# Patient Record
Sex: MALE | Race: WHITE | NOT HISPANIC OR LATINO | Employment: FULL TIME | URBAN - METROPOLITAN AREA
[De-identification: names, ages, dates, MRNs, and addresses within clinical notes are randomized per-mention and may not be internally consistent; named-entity substitution may affect disease eponyms.]

---

## 2022-01-21 ENCOUNTER — OFFICE VISIT (OUTPATIENT)
Dept: INTERNAL MEDICINE | Age: 37
End: 2022-01-21

## 2022-01-21 VITALS
SYSTOLIC BLOOD PRESSURE: 116 MMHG | TEMPERATURE: 96.9 F | HEART RATE: 72 BPM | OXYGEN SATURATION: 100 % | BODY MASS INDEX: 34.3 KG/M2 | HEIGHT: 69 IN | DIASTOLIC BLOOD PRESSURE: 82 MMHG | WEIGHT: 231.6 LBS

## 2022-01-21 DIAGNOSIS — Z00.00 ENCOUNTER FOR ANNUAL PHYSICAL EXAM: Primary | ICD-10-CM

## 2022-01-21 DIAGNOSIS — Z11.59 NEED FOR HEPATITIS C SCREENING TEST: ICD-10-CM

## 2022-01-21 PROCEDURE — 90715 TDAP VACCINE 7 YRS/> IM: CPT | Performed by: NURSE PRACTITIONER

## 2022-01-21 PROCEDURE — 99385 PREV VISIT NEW AGE 18-39: CPT | Performed by: NURSE PRACTITIONER

## 2022-01-21 PROCEDURE — 90471 IMMUNIZATION ADMIN: CPT | Performed by: NURSE PRACTITIONER

## 2022-01-21 NOTE — PROGRESS NOTES
"[facility] [place] [facility]  HARRISON Small Queenie / 36 y.o. / male  01/21/2022                        VITALS     Visit Vitals  /82 (Cuff Size: Adult)   Pulse 72   Temp 96.9 °F (36.1 °C) (Temporal)   Ht 175.3 cm (69\")   Wt 105 kg (231 lb 9.6 oz)   SpO2 100%   BMI 34.20 kg/m²       BP Readings from Last 3 Encounters:   01/21/22 116/82     Wt Readings from Last 3 Encounters:   01/21/22 105 kg (231 lb 9.6 oz)      Body mass index is 34.2 kg/m².    MEDICATIONS     No current outpatient medications on file.     No current facility-administered medications for this visit.       _____________________________________________________________________________________    CHIEF COMPLAINT     Establish Care, Annual Exam, and Labs Only (fasting)      HISTORY OF PRESENT ILLNESS      Nestor presents for annual health maintenance visit.    · Last health maintenance visit: many years ago  · General health: excellent  · Lifestyle:  · Attempting to lose weight?: Yes   · Diet: eats moderately healthy  · Exercise: exercises 3 days/week  · Tobacco: Never used   · Alcohol: 1-2 occasions/week  · Work: Full-time  · Reproductive health:  · Sexually active?: Yes   · Concern for STD?: No   · Sexual problems?: No problems   · Sees Urologist?: No   · Depression Screening:      PHQ-2/PHQ-9 Depression Screening 1/21/2022   Little interest or pleasure in doing things 0   Feeling down, depressed, or hopeless 0   Total Score 0         PHQ-2: 0 (Not depressed)     PHQ-9: 0 (Negative screening for depression)    Patient Care Team:  Amando Smith APRN as PCP - General (Internal Medicine)  ______________________________________________________________________    ALLERGIES  No Known Allergies     PFSH:     The following portions of the patient's history were reviewed and updated as appropriate: Allergies / Current Medications / Past Medical History / Surgical History / Social History / Family History    PROBLEM LIST   There is no problem " list on file for this patient.      PAST MEDICAL HISTORY  History reviewed. No pertinent past medical history.    SURGICAL HISTORY  Past Surgical History:   Procedure Laterality Date   • KNEE ARTHROSCOPY W/ ACL RECONSTRUCTION Left 2001   • KNEE ARTHROSCOPY W/ DEBRIDEMENT Right        SOCIAL HISTORY  Social History     Socioeconomic History   • Marital status:    Tobacco Use   • Smoking status: Never Smoker   • Smokeless tobacco: Never Used   Vaping Use   • Vaping Use: Never used   Substance and Sexual Activity   • Alcohol use: Yes     Alcohol/week: 5.0 standard drinks     Types: 5 Cans of beer per week   • Sexual activity: Yes     Partners: Male     Birth control/protection: None       FAMILY HISTORY  Family History   Problem Relation Age of Onset   • Diabetes type II Mother    • Obesity Mother    • Stroke Father    • Arthritis Maternal Grandmother    • Breast cancer Maternal Grandmother    • Arthritis Paternal Grandmother        IMMUNIZATION HISTORY  Immunization History   Administered Date(s) Administered   • COVID-19 (MODERNA) 1st, 2nd, 3rd Dose Only 03/23/2021, 04/20/2021, 11/22/2021   • FluLaval/Fluarix/Fluzone >6 12/07/2021       ______________________________________________________________________    REVIEW OF SYSTEMS     Review of Systems   Constitutional: Negative.    HENT: Negative.    Eyes: Negative.    Respiratory: Negative.    Cardiovascular: Negative.    Gastrointestinal: Negative.    Endocrine: Negative.    Genitourinary: Negative.    Musculoskeletal: Negative.    Skin: Negative.    Allergic/Immunologic: Negative.    Neurological: Negative.    Hematological: Negative.    Psychiatric/Behavioral: Negative.      PHYSICAL EXAMINATION     Physical Exam  Constitutional:       Appearance: Normal appearance. He is obese.   HENT:      Head: Normocephalic and atraumatic.      Right Ear: Tympanic membrane normal.      Left Ear: Tympanic membrane normal.      Nose: Nose normal.      Mouth/Throat:       Mouth: Mucous membranes are moist.      Pharynx: Oropharynx is clear.   Eyes:      Conjunctiva/sclera: Conjunctivae normal.      Pupils: Pupils are equal, round, and reactive to light.   Neck:      Thyroid: No thyromegaly.      Vascular: No carotid bruit.   Cardiovascular:      Rate and Rhythm: Normal rate and regular rhythm.      Pulses: Normal pulses.      Heart sounds: Normal heart sounds.   Pulmonary:      Effort: Pulmonary effort is normal.      Breath sounds: Normal breath sounds.   Abdominal:      Palpations: Abdomen is soft.      Tenderness: There is no right CVA tenderness or left CVA tenderness.   Genitourinary:     Comments: For BRIANNA until 49, no  symptoms, no family history of prostate cancer  Musculoskeletal:         General: Normal range of motion.      Cervical back: Normal range of motion.      Right lower leg: No edema.      Left lower leg: No edema.   Lymphadenopathy:      Cervical: No cervical adenopathy.   Skin:     General: Skin is warm and dry.   Neurological:      Mental Status: He is alert and oriented to person, place, and time.      Cranial Nerves: No cranial nerve deficit.      Motor: No weakness.      Gait: Gait normal.      Deep Tendon Reflexes: Babinski sign absent on the right side. Babinski sign absent on the left side.      Reflex Scores:       Patellar reflexes are 2+ on the right side and 2+ on the left side.  Psychiatric:         Thought Content: Thought content normal.         Judgment: Judgment normal.        REVIEWED DATA      Labs:    No results found for: NA, K, CALCIUM, AST, ALT, BUN, CREATININE, EGFRIFNONA, EGFRIFAFRI    No results found for: GLU, HGBA1C, TSH, FREET4    No results found for: PSA, TESTOSTEROTT, TESTFRE    No results found for: LDL, HDL, TRIG, CHOLHDLRATIO    No components found for: ESAR462R    No results found for: WBC, HGB, MCV, PLT    No results found for: PROTEIN, GLUCOSEU, BLOODU, NITRITEU, LEUKOCYTESUR     No results found for:  HEPCVIRUSABY    Imaging:           Medical Tests:       ______________________________________________________________________    ASSESSMENT & PLAN     ANNUAL WELLNESS EXAM / PHYSICAL     Other medical problems addressed today:  Patient presents to establish care with new provider and obtain annual physical with labs. Very minimal health history except for right knee debridement and reconstruction.     Family history of diabetes, obesity and brain aneurysm in father. Arthritis, breast cancer heart disease and arthritis in grandparents.    Never smoker, occasional alcohol use no substance use.  with a stepdaughter of 9 years old. 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    Summary/Discussion:     · Recommend follow-up in 1 year for annual physical with fasting labs, earlier if needed depending on lab obtained today.  · Decrease/eliminate soda, caffeine, alcohol and overall caloric intake. Reduce carbohydrates and sweets in diet.  Continue to improve dietary habits with lean proteins, fresh vegetables, fruits, and nuts. Improve aerobic exercise: walking/biking/swimming daily as tolerated, recommend 30 minutes/day at least 5 days/week.    Next Appointment with me: Visit date not found    No follow-ups on file.      HEALTHCARE MAINTENANCE ISSUES       Cancer Screening:  · Colon: Initial/Next screening at age: 45  · Repeat colon cancer screening: N/A at this time  · Prostate: Start screening at 45 then annually  · Testicular: Recommended monthly self exam  · Skin: Monthly self skin examination, annual exam by health professional  · Lung: Does not meet criteria for lung cancer screening.   · Other:    Screening Labs & Tests:  · Lab results reviewed & discussed with with patient or orders placed today.  · EKG:  · CV Screening: Lipid panel  · DEXA (75+ or risk factors):   · HEP C (If born [birth year]256-[birth year]01 or risk factors): Ordered  · Other:     Immunization/Vaccinations (to be  given today unless deferred by patient)  · Influenza: Patient had the flu shot this season  · Hepatitis A: Recommended here or at pharmacy  · Tetanus/Pertussis: Administer today  · Pneumovax: Not needed at this time  · Shingles: Not needed at this time  · Other:     Lifestyle Counseling:  · Lifestyle Modifications: Improve dietary compliance and Increase intensity/regularity of aerobic exercise  · Safety Issues: Always wear seatbelt, Avoid texting while driving   · Use sunscreen, regular skin examination  · Recommended annual dental/vision examination.  · Emotional/Stress/Sleep: Reviewed and  given when appropriate      Health Maintenance   Topic Date Due   • TDAP/TD VACCINES (1 - Tdap) Never done   • HEPATITIS C SCREENING  Never done   • ANNUAL PHYSICAL  01/22/2023   • COVID-19 Vaccine  Completed   • INFLUENZA VACCINE  Completed   • Pneumococcal Vaccine 0-64  Aged Out         Examiner was wearing KN95 mask, face shield and exam gloves during the entire duration of the visit. Patient was masked the entire time.   Minimum social distance of 6 ft maintained entire visit except if physical contact was necessary as documented.     **Dragon Disclaimer:   Much of this encounter note is an electronic transcription/translation of spoken language to printed text. The electronic translation of spoken language may permit erroneous, or at times, nonsensical words or phrases to be inadvertently transcribed. Although I have reviewed the note for such errors, some may still exist.

## 2022-01-22 LAB
ALBUMIN SERPL-MCNC: 5 G/DL (ref 4–5)
ALBUMIN/GLOB SERPL: 2 {RATIO} (ref 1.2–2.2)
ALP SERPL-CCNC: 69 IU/L (ref 44–121)
ALT SERPL-CCNC: 39 IU/L (ref 0–44)
APPEARANCE UR: CLEAR
AST SERPL-CCNC: 25 IU/L (ref 0–40)
BASOPHILS # BLD AUTO: 0.1 X10E3/UL (ref 0–0.2)
BASOPHILS NFR BLD AUTO: 1 %
BILIRUB SERPL-MCNC: 0.9 MG/DL (ref 0–1.2)
BILIRUB UR QL STRIP: NEGATIVE
BUN SERPL-MCNC: 12 MG/DL (ref 6–20)
BUN/CREAT SERPL: 12 (ref 9–20)
CALCIUM SERPL-MCNC: 9.5 MG/DL (ref 8.7–10.2)
CHLORIDE SERPL-SCNC: 102 MMOL/L (ref 96–106)
CHOLEST SERPL-MCNC: 266 MG/DL (ref 100–199)
CHOLEST/HDLC SERPL: 6.7 RATIO (ref 0–5)
CO2 SERPL-SCNC: 23 MMOL/L (ref 20–29)
COLOR UR: YELLOW
CREAT SERPL-MCNC: 0.99 MG/DL (ref 0.76–1.27)
EOSINOPHIL # BLD AUTO: 0.3 X10E3/UL (ref 0–0.4)
EOSINOPHIL NFR BLD AUTO: 5 %
ERYTHROCYTE [DISTWIDTH] IN BLOOD BY AUTOMATED COUNT: 13.2 % (ref 11.6–15.4)
GLOBULIN SER CALC-MCNC: 2.5 G/DL (ref 1.5–4.5)
GLUCOSE SERPL-MCNC: 95 MG/DL (ref 65–99)
GLUCOSE UR QL STRIP: NEGATIVE
HBA1C MFR BLD: 5.3 % (ref 4.8–5.6)
HCT VFR BLD AUTO: 48.9 % (ref 37.5–51)
HCV AB S/CO SERPL IA: <0.1 S/CO RATIO (ref 0–0.9)
HDLC SERPL-MCNC: 40 MG/DL
HGB BLD-MCNC: 16.4 G/DL (ref 13–17.7)
HGB UR QL STRIP: NEGATIVE
IMM GRANULOCYTES # BLD AUTO: 0 X10E3/UL (ref 0–0.1)
IMM GRANULOCYTES NFR BLD AUTO: 0 %
KETONES UR QL STRIP: NEGATIVE
LDLC SERPL CALC-MCNC: 179 MG/DL (ref 0–99)
LEUKOCYTE ESTERASE UR QL STRIP: NEGATIVE
LYMPHOCYTES # BLD AUTO: 1.3 X10E3/UL (ref 0.7–3.1)
LYMPHOCYTES NFR BLD AUTO: 21 %
MCH RBC QN AUTO: 30.8 PG (ref 26.6–33)
MCHC RBC AUTO-ENTMCNC: 33.5 G/DL (ref 31.5–35.7)
MCV RBC AUTO: 92 FL (ref 79–97)
MICRO URNS: NORMAL
MONOCYTES # BLD AUTO: 0.5 X10E3/UL (ref 0.1–0.9)
MONOCYTES NFR BLD AUTO: 9 %
NEUTROPHILS # BLD AUTO: 3.9 X10E3/UL (ref 1.4–7)
NEUTROPHILS NFR BLD AUTO: 64 %
NITRITE UR QL STRIP: NEGATIVE
PH UR STRIP: 5 [PH] (ref 5–7.5)
PLATELET # BLD AUTO: 298 X10E3/UL (ref 150–450)
POTASSIUM SERPL-SCNC: 4.6 MMOL/L (ref 3.5–5.2)
PROT SERPL-MCNC: 7.5 G/DL (ref 6–8.5)
PROT UR QL STRIP: NEGATIVE
RBC # BLD AUTO: 5.33 X10E6/UL (ref 4.14–5.8)
SODIUM SERPL-SCNC: 140 MMOL/L (ref 134–144)
SP GR UR STRIP: 1.02 (ref 1–1.03)
T4 FREE SERPL-MCNC: 1.3 NG/DL (ref 0.82–1.77)
TRIGL SERPL-MCNC: 245 MG/DL (ref 0–149)
TSH SERPL-ACNC: 2.75 UIU/ML (ref 0.45–4.5)
UROBILINOGEN UR STRIP-MCNC: 0.2 MG/DL (ref 0.2–1)
VLDLC SERPL CALC-MCNC: 47 MG/DL (ref 5–40)
WBC # BLD AUTO: 6 X10E3/UL (ref 3.4–10.8)

## 2022-01-24 DIAGNOSIS — E78.5 HYPERLIPIDEMIA, UNSPECIFIED HYPERLIPIDEMIA TYPE: Primary | ICD-10-CM

## 2022-05-24 DIAGNOSIS — E78.5 HYPERLIPIDEMIA, UNSPECIFIED HYPERLIPIDEMIA TYPE: ICD-10-CM

## 2022-06-14 LAB
CHOLEST SERPL-MCNC: 217 MG/DL (ref 100–199)
CHOLEST/HDLC SERPL: 5.2 RATIO (ref 0–5)
HDLC SERPL-MCNC: 42 MG/DL
LDLC SERPL CALC-MCNC: 157 MG/DL (ref 0–99)
TRIGL SERPL-MCNC: 99 MG/DL (ref 0–149)
VLDLC SERPL CALC-MCNC: 18 MG/DL (ref 5–40)

## 2022-09-02 ENCOUNTER — HOSPITAL ENCOUNTER (OUTPATIENT)
Facility: HOSPITAL | Age: 37
Setting detail: OBSERVATION
Discharge: HOME | End: 2022-09-03
Attending: EMERGENCY MEDICINE | Admitting: EMERGENCY MEDICINE

## 2022-09-02 ENCOUNTER — APPOINTMENT (OUTPATIENT)
Dept: GENERAL RADIOLOGY | Facility: HOSPITAL | Age: 37
End: 2022-09-02

## 2022-09-02 ENCOUNTER — APPOINTMENT (OUTPATIENT)
Dept: CT IMAGING | Facility: HOSPITAL | Age: 37
End: 2022-09-02

## 2022-09-02 DIAGNOSIS — R55 SYNCOPE AND COLLAPSE: Primary | ICD-10-CM

## 2022-09-02 PROBLEM — R42 DIZZINESS: Status: ACTIVE | Noted: 2022-09-02

## 2022-09-02 PROBLEM — R42 DIZZINESS: Status: RESOLVED | Noted: 2022-09-02 | Resolved: 2022-09-02

## 2022-09-02 LAB
ALBUMIN SERPL-MCNC: 5.2 G/DL (ref 3.5–5.2)
ALBUMIN/GLOB SERPL: 2.4 G/DL
ALP SERPL-CCNC: 67 U/L (ref 39–117)
ALT SERPL W P-5'-P-CCNC: 26 U/L (ref 1–41)
ANION GAP SERPL CALCULATED.3IONS-SCNC: 15.7 MMOL/L (ref 5–15)
AST SERPL-CCNC: 22 U/L (ref 1–40)
BASOPHILS # BLD AUTO: 0.05 10*3/MM3 (ref 0–0.2)
BASOPHILS NFR BLD AUTO: 0.8 % (ref 0–1.5)
BILIRUB SERPL-MCNC: 1 MG/DL (ref 0–1.2)
BILIRUB UR QL STRIP: NEGATIVE
BUN SERPL-MCNC: 11 MG/DL (ref 6–20)
BUN/CREAT SERPL: 11.8 (ref 7–25)
CALCIUM SPEC-SCNC: 9.4 MG/DL (ref 8.6–10.5)
CHLORIDE SERPL-SCNC: 101 MMOL/L (ref 98–107)
CLARITY UR: CLEAR
CO2 SERPL-SCNC: 25.3 MMOL/L (ref 22–29)
COLOR UR: YELLOW
CREAT SERPL-MCNC: 0.93 MG/DL (ref 0.76–1.27)
DEPRECATED RDW RBC AUTO: 43.9 FL (ref 37–54)
EGFRCR SERPLBLD CKD-EPI 2021: 107.8 ML/MIN/1.73
EOSINOPHIL # BLD AUTO: 0.23 10*3/MM3 (ref 0–0.4)
EOSINOPHIL NFR BLD AUTO: 3.7 % (ref 0.3–6.2)
ERYTHROCYTE [DISTWIDTH] IN BLOOD BY AUTOMATED COUNT: 13.3 % (ref 12.3–15.4)
GLOBULIN UR ELPH-MCNC: 2.2 GM/DL
GLUCOSE SERPL-MCNC: 95 MG/DL (ref 65–99)
GLUCOSE UR STRIP-MCNC: NEGATIVE MG/DL
HCT VFR BLD AUTO: 45.3 % (ref 37.5–51)
HGB BLD-MCNC: 15.8 G/DL (ref 13–17.7)
HGB UR QL STRIP.AUTO: NEGATIVE
HOLD SPECIMEN: NORMAL
HOLD SPECIMEN: NORMAL
IMM GRANULOCYTES # BLD AUTO: 0.02 10*3/MM3 (ref 0–0.05)
IMM GRANULOCYTES NFR BLD AUTO: 0.3 % (ref 0–0.5)
INR PPP: 0.98 (ref 0.9–1.1)
KETONES UR QL STRIP: NEGATIVE
LEUKOCYTE ESTERASE UR QL STRIP.AUTO: NEGATIVE
LYMPHOCYTES # BLD AUTO: 1.51 10*3/MM3 (ref 0.7–3.1)
LYMPHOCYTES NFR BLD AUTO: 24.2 % (ref 19.6–45.3)
MAGNESIUM SERPL-MCNC: 1.9 MG/DL (ref 1.6–2.6)
MAGNESIUM SERPL-MCNC: 2.1 MG/DL (ref 1.6–2.6)
MCH RBC QN AUTO: 31.3 PG (ref 26.6–33)
MCHC RBC AUTO-ENTMCNC: 34.9 G/DL (ref 31.5–35.7)
MCV RBC AUTO: 89.7 FL (ref 79–97)
MONOCYTES # BLD AUTO: 0.5 10*3/MM3 (ref 0.1–0.9)
MONOCYTES NFR BLD AUTO: 8 % (ref 5–12)
NEUTROPHILS NFR BLD AUTO: 3.93 10*3/MM3 (ref 1.7–7)
NEUTROPHILS NFR BLD AUTO: 63 % (ref 42.7–76)
NITRITE UR QL STRIP: NEGATIVE
NRBC BLD AUTO-RTO: 0 /100 WBC (ref 0–0.2)
PH UR STRIP.AUTO: 6 [PH] (ref 5–8)
PLATELET # BLD AUTO: 236 10*3/MM3 (ref 140–450)
PMV BLD AUTO: 9.6 FL (ref 6–12)
POTASSIUM SERPL-SCNC: 3.8 MMOL/L (ref 3.5–5.2)
PROT SERPL-MCNC: 7.4 G/DL (ref 6–8.5)
PROT UR QL STRIP: NEGATIVE
PROTHROMBIN TIME: 12.9 SECONDS (ref 11.7–14.2)
QT INTERVAL: 385 MS
RBC # BLD AUTO: 5.05 10*6/MM3 (ref 4.14–5.8)
SODIUM SERPL-SCNC: 142 MMOL/L (ref 136–145)
SP GR UR STRIP: 1.01 (ref 1–1.03)
TROPONIN T SERPL-MCNC: <0.01 NG/ML (ref 0–0.03)
TROPONIN T SERPL-MCNC: <0.01 NG/ML (ref 0–0.03)
UROBILINOGEN UR QL STRIP: NORMAL
WBC NRBC COR # BLD: 6.24 10*3/MM3 (ref 3.4–10.8)
WHOLE BLOOD HOLD COAG: NORMAL
WHOLE BLOOD HOLD SPECIMEN: NORMAL

## 2022-09-02 PROCEDURE — 70450 CT HEAD/BRAIN W/O DYE: CPT

## 2022-09-02 PROCEDURE — 71046 X-RAY EXAM CHEST 2 VIEWS: CPT

## 2022-09-02 PROCEDURE — 83735 ASSAY OF MAGNESIUM: CPT

## 2022-09-02 PROCEDURE — 84484 ASSAY OF TROPONIN QUANT: CPT | Performed by: EMERGENCY MEDICINE

## 2022-09-02 PROCEDURE — 80053 COMPREHEN METABOLIC PANEL: CPT

## 2022-09-02 PROCEDURE — G0378 HOSPITAL OBSERVATION PER HR: HCPCS

## 2022-09-02 PROCEDURE — 99284 EMERGENCY DEPT VISIT MOD MDM: CPT

## 2022-09-02 PROCEDURE — 36415 COLL VENOUS BLD VENIPUNCTURE: CPT

## 2022-09-02 PROCEDURE — 81003 URINALYSIS AUTO W/O SCOPE: CPT

## 2022-09-02 PROCEDURE — 93005 ELECTROCARDIOGRAM TRACING: CPT

## 2022-09-02 PROCEDURE — 83735 ASSAY OF MAGNESIUM: CPT | Performed by: EMERGENCY MEDICINE

## 2022-09-02 PROCEDURE — 93010 ELECTROCARDIOGRAM REPORT: CPT | Performed by: INTERNAL MEDICINE

## 2022-09-02 PROCEDURE — 84484 ASSAY OF TROPONIN QUANT: CPT

## 2022-09-02 PROCEDURE — 85610 PROTHROMBIN TIME: CPT | Performed by: EMERGENCY MEDICINE

## 2022-09-02 PROCEDURE — 85025 COMPLETE CBC W/AUTO DIFF WBC: CPT

## 2022-09-02 RX ORDER — NITROGLYCERIN 0.4 MG/1
0.4 TABLET SUBLINGUAL
Status: DISCONTINUED | OUTPATIENT
Start: 2022-09-02 | End: 2022-09-03 | Stop reason: HOSPADM

## 2022-09-02 RX ORDER — SODIUM CHLORIDE 0.9 % (FLUSH) 0.9 %
10 SYRINGE (ML) INJECTION AS NEEDED
Status: DISCONTINUED | OUTPATIENT
Start: 2022-09-02 | End: 2022-09-03 | Stop reason: HOSPADM

## 2022-09-02 RX ORDER — SODIUM CHLORIDE 0.9 % (FLUSH) 0.9 %
10 SYRINGE (ML) INJECTION EVERY 12 HOURS SCHEDULED
Status: DISCONTINUED | OUTPATIENT
Start: 2022-09-02 | End: 2022-09-03 | Stop reason: HOSPADM

## 2022-09-02 NOTE — H&P
[facility]   HISTORY AND PHYSICAL    Patient Name: Nestor Jerome  : 1985  MRN: [id number]  Primary Care Physician:  Amando Smith APRN  Date of admission: 2022    Subjective   Subjective     Chief Complaint:   Chief Complaint   Patient presents with   • Dizziness   • Syncope         HPI:    Nestor Jerome is a 37 y.o. male no significant past medical history who presents to [facility] ER with dizziness and a syncopal episode.  Patient states last night he had difficulty sleeping and stayed up for most of the night.  He states today around noon while he was working he began to feel lightheaded and nauseous prompting him to lie on the ground.  He states he is pretty certain he passed out and did not fall asleep.  He states he looked through his phone and his notifications noting he he had a time loss of about 5 minutes.  He states when he awoken he was alert and asymptomatic at that time.  He denies any recent fevers and chills.  He does endorse a mild diffuse headache at this time.  Denies any numbness and tingling or focal weakness.  He denies chest pain and shortness of breath.  Denies palpitations.  Denies abdominal pain, vomiting, and diarrhea.  Patient reports he takes no home medications and denies any history of heart or lung problems.    ER evaluation significant for CT head without revealing no acute process with note of a partially empty sella and a large mucous retention cyst in the left paranasal sinuses.  Chest x-ray showed no acute findings.  Laboratory evaluation fairly unremarkable.  Patient noted with 2 negative troponins and EKG noted nonischemic.  Orthostatics were negative in the ER. Patient currently asymptomatic.    Review of Systems   All systems were reviewed and negative except for: what is mentioned above in the HPI.     Personal History     No past medical history on file.    Past Surgical History:   Procedure Laterality Date   • KNEE ARTHROSCOPY W/ ACL  RECONSTRUCTION Left 2001   • KNEE ARTHROSCOPY W/ DEBRIDEMENT Right        Family History: family history includes Arthritis in his maternal grandmother and paternal grandmother; Breast cancer in his maternal grandmother; Diabetes type II in his mother; Heart disease in his maternal grandfather; Obesity in his mother; Stroke in his father. Otherwise pertinent FHx was reviewed and not pertinent to current issue.    Social History:  reports that he has never smoked. He has never used smokeless tobacco. He reports current alcohol use of about 5.0 standard drinks of alcohol per week. He reports that he does not use drugs.    Home Medications:   None per patient    Allergies:  No Known Allergies    Objective   Objective     Vitals:   Temp:  [97.8 °F (36.6 °C)-98 °F (36.7 °C)] 98 °F (36.7 °C)  Heart Rate:  [46-98] 76  Resp:  [18] 18  BP: ()/(49-98) 147/98  Physical Exam    Constitutional: Awake, alert   Eyes: PERRLA, sclerae anicteric, no conjunctival injection   HENT: NCAT, mucous membranes moist   Neck: Supple, no thyromegaly, no lymphadenopathy, trachea midline   Respiratory: Clear to auscultation bilaterally, nonlabored respirations    Cardiovascular: RRR, no murmurs, rubs, or gallops, palpable pedal pulses bilaterally   Gastrointestinal: Positive bowel sounds, soft, nontender, nondistended   Musculoskeletal: No bilateral ankle edema, no clubbing or cyanosis to extremities   Psychiatric: Appropriate affect, cooperative   Neurologic: Oriented x 3, strength symmetric in all extremities, Cranial Nerves grossly intact to confrontation, speech clear, no facial droop noted   Skin: No rashes     Result Review    Result Review:  I have personally reviewed the results from the time of this admission to 9/2/2022 19:54 EDT and agree with these findings:  [x]  Laboratory list / accordion  []  Microbiology  [x]  Radiology  [x]  EKG/Telemetry   []  Cardiology/Vascular   []  Pathology  []  Old records  []  Other:  Most notable  findings include:     XR Chest 2 View    Result Date: 9/2/2022  CHEST TWO VIEWS  HISTORY: Weakness, dizziness.  COMPARISON: None.  PA and lateral views of the chest demonstrate the heart to be within normal limits in size. There is no evidence of infiltrate, effusion or of congestive failure. Mild dextroscoliosis of the thoracic spine is appreciated.      CT Head Without Contrast    Result Date: 9/2/2022  CT HEAD WITHOUT CONTRAST  HISTORY: Syncope, dizziness.  COMPARISON: None.  FINDINGS: There is minimal asymmetry related to the lateral ventricles with the right lateral ventricle being slightly larger than that of the left. This is within normal limits in terms of variance. There is no evidence of intracranial hemorrhage, hydrocephalus or of abnormal extra-axial fluid. No focal area of decreased attenuation to suggest acute infarction is identified. There is partial visualization of the left maxillary sinus which is completely opacified. There is likely a large mucus retention cyst involving the majority of the left maxillary sinus. There is a partially empty sella of doubtful clinical significance.      1. No acute process identified. Further evaluation could be performed with MRI examination of the brain as indicated. 2. There is a partially empty sella which is of doubtful clinical significance. 3. There is partial visualization of the paranasal sinuses but a large mucus retention cyst is present on the left.    Radiation dose reduction techniques were utilized, including automated exposure control and exposure modulation based on body size.           Assessment & Plan   Assessment / Plan     Brief Patient Summary:  Nestor Jerome is a 37 y.o. male who is being admitted to the observation unit for further evaluation of a syncopal episode and dizziness. Plan for cardiology consultation.     Active Hospital Problems:  Active Hospital Problems    Diagnosis    • Syncope      Plan:     Syncope  -Patient is currently  asymptomatic at this time  -Orthostatics negative in the ER  -CT head negative for acute process, was noted of a partially empty sella with doubtful clinical significance and a large mucous retention cyst in the left paranasal sinus, patient notified of the findings and advised for outpatient follow-up with PCP  -Chest x-ray negative for acute findings  -EKG normal sinus rhythm, no evidence of ischemia  -Consult to cardiology  -Echocardiogram ordered      DVT prophylaxis:  Mechanical DVT prophylaxis orders are present.    CODE STATUS:    Level Of Support Discussed With: Patient  Code Status (Patient has no pulse and is not breathing): CPR (Attempt to Resuscitate)  Medical Interventions (Patient has pulse or is breathing): Full Support    Admission Status:  I believe this patient meets observation status.    I wore an face mask, eye protection, and gloves during this patient encounter. Patient also wearing a surgical mask. Hand hygeine performed before and after seeing the patient.    Electronically signed by Shayna Dixon PA-C, 09/02/22, 7:54 PM EDT.

## 2022-09-02 NOTE — CASE MANAGEMENT/SOCIAL WORK
Discharge Planning Assessment  [facility]     Patient Name: Nestor Jerome  MRN: [id number]  Today's Date: 9/2/2022    Admit Date: 9/2/2022     Discharge Needs Assessment     Row Name 09/02/22 1650       Living Environment    People in Home spouse    Name(s) of People in Home Radha Wade [phone]    Current Living Arrangements home    Primary Care Provided by self    Provides Primary Care For no one    Family Caregiver if Needed spouse    Family Caregiver Names Radha Wade    Quality of Family Relationships helpful;involved    Able to Return to Prior Arrangements yes       Resource/Environmental Concerns    Resource/Environmental Concerns none    Transportation Concerns none       Transition Planning    Patient/Family Anticipates Transition to home    Patient/Family Anticipated Services at Transition none    Transportation Anticipated car, drives self;family or friend will provide       Discharge Needs Assessment    Readmission Within the Last 30 Days no previous admission in last 30 days    Equipment Currently Used at Home none    Concerns to be Addressed no discharge needs identified;denies needs/concerns at this time    Anticipated Changes Related to Illness none    Equipment Needed After Discharge none    Provided Post Acute Provider List? N/A    Provided Post Acute Provider Quality & Resource List? N/A               Discharge Plan     Row Name 09/02/22 1652       Plan    Plan Pt intends to return home upon discharge    Patient/Family in Agreement with Plan yes    Provided Post Acute Provider List? N/A    Provided Post Acute Provider Quality & Resource List? N/A    Plan Comments Face sheet verified, role of CCP explained. Pt is independent in ADL's and denies the need for any assistive devices. Pt denies any difficulty paying for medications. Advised pt to contact case management if any further needs arise              Continued Care and Services - Admitted Since 9/2/2022    Coordination has not  been started for this encounter.          Demographic Summary    No documentation.                Functional Status    No documentation.                Psychosocial    No documentation.                Abuse/Neglect    No documentation.                Legal    No documentation.                Substance Abuse    No documentation.                Patient Forms    No documentation.                   Alexa Miles RN

## 2022-09-02 NOTE — ED NOTES
"Nursing report ED to floor  Nestor Jerome  37 y.o.  male    HPI :   Chief Complaint   Patient presents with   • Dizziness   • Syncope       Admitting doctor:   Dwight Paris MD    Admitting diagnosis:   The encounter diagnosis was Syncope and collapse.    Code status:   Current Code Status     Date Active Code Status Order ID Comments User Context       9/2/2022 1519 CPR (Attempt to Resuscitate) [id number]  Yu Vazquez APRN ED     Advance Care Planning Activity      Questions for Current Code Status     Question Answer    Code Status (Patient has no pulse and is not breathing) CPR (Attempt to Resuscitate)    Medical Interventions (Patient has pulse or is breathing) Full Support    Level Of Support Discussed With Patient          Allergies:   Patient has no known allergies.    Intake and Output  No intake or output data in the 24 hours ending 09/02/22 1716    Weight:       09/02/22  1537   Weight: 100 kg (221 lb)       Most recent vitals:   Vitals:    09/02/22 1533 09/02/22 1537 09/02/22 1540 09/02/22 1600   BP: 95/49  150/94 143/93   Pulse: (!) 46  64 53   Resp:       Temp:       TempSrc:       SpO2: 97%  100% 100%   Weight:  100 kg (221 lb)     Height:  175.3 cm (69\")         Active LDAs/IV Access:   Lines, Drains & Airways     Active LDAs     Name Placement date Placement time Site Days    Peripheral IV 09/02/22 1534 Right Antecubital 09/02/22  1534  Antecubital  less than 1                Labs (abnormal labs have a star):   Labs Reviewed   COMPREHENSIVE METABOLIC PANEL - Abnormal; Notable for the following components:       Result Value    Anion Gap 15.7 (*)     All other components within normal limits    Narrative:     GFR Normal >60  Chronic Kidney Disease <60  Kidney Failure <15     TROPONIN (IN-HOUSE) - Normal    Narrative:     Troponin T Reference Range:  <= 0.03 ng/mL-   Negative for AMI  >0.03 ng/mL-     Abnormal for myocardial necrosis.  Clinicians would have to utilize clinical acumen, EKG, Troponin " and serial changes to determine if it is an Acute Myocardial Infarction or myocardial injury due to an underlying chronic condition.       Results may be falsely decreased if patient taking Biotin.     MAGNESIUM - Normal   URINALYSIS W/ CULTURE IF INDICATED - Normal    Narrative:     In absence of clinical symptoms, the presence of pyuria, bacteria, and/or nitrites on the urinalysis result does not correlate with infection.  Urine microscopic not indicated.   CBC WITH AUTO DIFFERENTIAL - Normal   MAGNESIUM - Normal   PROTIME-INR - Normal   TROPONIN (IN-HOUSE) - Normal    Narrative:     Troponin T Reference Range:  <= 0.03 ng/mL-   Negative for AMI  >0.03 ng/mL-     Abnormal for myocardial necrosis.  Clinicians would have to utilize clinical acumen, EKG, Troponin and serial changes to determine if it is an Acute Myocardial Infarction or myocardial injury due to an underlying chronic condition.       Results may be falsely decreased if patient taking Biotin.     RAINBOW DRAW    Narrative:     The following orders were created for panel order [place] Draw.  Procedure                               Abnormality         Status                     ---------                               -----------         ------                     Green Top (Gel)[[id number]]                                  Final result               Lavender Top[[id number]]                                     Final result               Gold Top - SST[[id number]]                                   Final result               Light Blue Top[[id number]]                                   Final result                 Please view results for these tests on the individual orders.   CBC AND DIFFERENTIAL    Narrative:     The following orders were created for panel order CBC & Differential.  Procedure                               Abnormality         Status                     ---------                               -----------         ------                     CBC  Auto Differential[[id number]]        Normal              Final result                 Please view results for these tests on the individual orders.   GREEN TOP   LAVENDER TOP   GOLD TOP - SST   LIGHT BLUE TOP       EKG:   ECG 12 Lead   Preliminary Result   HEART RATE= 68  bpm   RR Interval= 882  ms   [place] Interval= 150  ms   P Horizontal Axis= 18  deg   P Front Axis= 40  deg   QRSD Interval= 105  ms   QT Interval= 385  ms   QRS Axis= 59  deg   T Wave Axis= 19  deg   - NORMAL ECG -   Sinus rhythm   Electronically Signed By:    Date and Time of Study: 2022-09-02 13:33:33          Meds given in ED:   Medications   sodium chloride 0.9 % flush 10 mL (has no administration in time range)   sodium chloride 0.9 % flush 10 mL (has no administration in time range)   nitroglycerin (NITROSTAT) SL tablet 0.4 mg (has no administration in time range)   sodium chloride 0.9 % flush 10 mL (has no administration in time range)   sodium chloride 0.9 % flush 10 mL (has no administration in time range)       Imaging results:  CT Head Without Contrast    Result Date: 9/2/2022  1. No acute process identified. Further evaluation could be performed with MRI examination of the brain as indicated. 2. There is a partially empty sella which is of doubtful clinical significance. 3. There is partial visualization of the paranasal sinuses but a large mucus retention cyst is present on the left.    Radiation dose reduction techniques were utilized, including automated exposure control and exposure modulation based on body size.         Ambulatory status:   - stand by assist    Social issues:   Social History     Socioeconomic History   • Marital status:    Tobacco Use   • Smoking status: Never Smoker   • Smokeless tobacco: Never Used   Vaping Use   • Vaping Use: Never used   Substance and Sexual Activity   • Alcohol use: Yes     Alcohol/week: 5.0 standard drinks     Types: 5 Cans of beer per week   • Drug use: Never   • Sexual activity: Yes      Partners: Female     Birth control/protection: None       NIH Stroke Scale:        Nursing report ED to floor:

## 2022-09-02 NOTE — PLAN OF CARE
Goal Outcome Evaluation:   Pt admitted for dizziness and passing out for 5 minutes while working from home. Cards consulted. Npo at midnight. VSS.

## 2022-09-02 NOTE — ED TRIAGE NOTES
Pt presents to ED with complaints of possible syncopal episode while in his office today. Pt states he got dizzy around 1245-pt laid down on the floor and felt as though he lost consciousness. Pt denies CP-somewhat dizzy at triage and increased fatigue. Pt denies any complaints before this episode.

## 2022-09-02 NOTE — ED PROVIDER NOTES
EMERGENCY DEPARTMENT ENCOUNTER    Room Number:  109/1  Date of encounter:  9/7/2022  PCP: Amando Smith APRN  Historian: Patient and spouse      HPI:  Chief Complaint: Passing out episodes  A complete HPI/ROS/PMH/PSH/SH/FH are unobtainable due to: Not applicable  Context: Nestor Jerome is a 37 y.o. male who presents to the ED c/o patient today 2 hours prior to arrival here was at work.  He works from home.  He felt a little nausea.  He laid down and he believes he passed out for 5 minutes.  He was home alone.  He did not have any urinary or fecal incontinence or retention.  He did not bite his lip or tongue.  He had a similar episode that he believes last night a little before midnight.  He denies any chest pain or shortness of breath.  He is pretty certain that he did not fall asleep and believes that he passed out.  These episodes were unwitnessed.  His spouse said that he appeared a little confused and forgetful when she arrived home but that is 100% resolved.  He denies any headache, focal weakness, fever, chills, or any other complaints.  He has no history of heart problems, lung problems.  Again denies any chest pain, swelling to his arms or legs, or any other complaint.  Currently is asymptomatic        Previous Episodes: No  Current Symptoms: See above    MEDICAL HISTORY REVIEWED        PAST MEDICAL HISTORY  Active Ambulatory Problems     Diagnosis Date Noted   • No Active Ambulatory Problems     Resolved Ambulatory Problems     Diagnosis Date Noted   • No Resolved Ambulatory Problems     No Additional Past Medical History         PAST SURGICAL HISTORY  Past Surgical History:   Procedure Laterality Date   • KNEE ARTHROSCOPY W/ ACL RECONSTRUCTION Left 2001   • KNEE ARTHROSCOPY W/ DEBRIDEMENT Right          FAMILY HISTORY  Family History   Problem Relation Age of Onset   • Diabetes type II Mother    • Obesity Mother    • Stroke Father    • Arthritis Maternal Grandmother    • Breast cancer Maternal  Grandmother    • Arthritis Paternal Grandmother    • Heart disease Maternal Grandfather          SOCIAL HISTORY  Social History     Socioeconomic History   • Marital status:    Tobacco Use   • Smoking status: Never Smoker   • Smokeless tobacco: Never Used   Vaping Use   • Vaping Use: Never used   Substance and Sexual Activity   • Alcohol use: Yes     Alcohol/week: 5.0 standard drinks     Types: 5 Cans of beer per week   • Drug use: Never   • Sexual activity: Yes     Partners: Female     Birth control/protection: None         ALLERGIES  Patient has no known allergies.        REVIEW OF SYSTEMS  Review of Systems     All systems reviewed and negative except for those discussed in HPI.       PHYSICAL EXAM    I have reviewed the triage vital signs and nursing notes.    ED Triage Vitals   Temp Heart Rate Resp BP SpO2   09/02/22 1329 09/02/22 1329 09/02/22 1329 09/02/22 1449 09/02/22 1329   97.8 °F (36.6 °C) 75 18 140/96 99 %      Temp src Heart Rate Source Patient Position BP Location FiO2 (%)   09/02/22 1329 09/02/22 1329 -- -- --   Oral Monitor          GENERAL: Male no acute distress.Vital signs on my initial evaluation unremarkable  HENT: nares patent  Head/neck/ face are symmetric without gross deformity, signs of trauma, or swelling  EYES: no scleral icterus, no conjunctival pallor.  NECK: Supple, no meningismus  CV: regular rhythm, regular rate with intact distal pulses.  No murmur rub  RESPIRATORY: normal effort and no respiratory distress.  Clear to auscultation bilaterally  ABDOMEN: soft and nontender.  MUSCULOSKELETAL: no deformity.  Intact distal pulses to upper and lower extremities that are equal strong and symmetric.  No edema or swelling  NEURO: alert and appropriate, moves all extremities, follows commands.  No focal motor or sensory changes  SKIN: warm, dry    Vital signs and nursing notes reviewed.        LAB RESULTS  No results found for this or any previous visit (from the past 24  hour(s)).    Ordered the above labs and independently reviewed the results.        RADIOLOGY  No Radiology Exams Resulted Within Past 24 Hours    I ordered the above noted radiological studies. Reviewed by me and discussed with radiologist.  See dictation for official radiology interpretation.      PROCEDURES    Procedures      MEDICATIONS GIVEN IN ER    Medications   perflutren (DEFINITY) 8.476 mg in Sodium Chloride (PF) 0.9 % 10 mL injection (2 mL Intravenous Given 9/3/22 0831)         PROGRESS, DATA ANALYSIS, CONSULTS, AND MEDICAL DECISION MAKING    Informed patient of the test that we will order.  All questions answered at this time.  He is asymptomatic.    We are currently under a pandemic from the COVID19 infection.  The patient presented to the emergency department by ambulance or personal vehicle. I followed the current protocols required by [facility] in my evaluation and treatment of the patient. The patient was wearing a face mask during my evaluation and throughout my encounter. During my whole encounter with this patient I used appropriate personal protective equipment.  This equipment consisted of eye protection, facemask, gown, and gloves.  I applied this equipment before entering the room.      All labs have been independently reviewed by me.  All radiology studies have been reviewed by me and discussed with radiologist dictating the report.   EKG's independently viewed and interpreted by me.  Discussion below represents my analysis of pertinent findings related to patient's condition, differential diagnosis, treatment plan and final disposition.      ED Course as of 09/07/22 2307   Fri Sep 02, 2022   1445 I reviewed the chest x-ray report from the radiologist I also looked at the chest x-ray.  No acute abnormality appreciated. [MM]   1445 EKG reading  EKG was done at 1333  Its a rate of 68 it is sinus rhythm there is very mild intraventricular conduction delay  there is normal axis I do not appreciate any acute injury pattern  Normal QT  I do not see any signs of WPW, Brugada syndrome, QT abnormality or hypertrophic obstructive cardiomyopathy changes on this EKG.  No old EKG to compare with [MM]   6677 I discussed the case with Yu who is the midlevel provider in the observation unit.  She agrees to admit the patient to the hospital. [MM]   1847 I talked with the patient as well as spouse about the results of the test and EKG.  The patient and the spouse would prefer to be thorough in the evaluation.  They actually would like to be admitted to the observation unit for further monitoring. [MM]      ED Course User Index  [MM] Neto Fam MD       AS OF 23:07 EDT VITALS:    BP - 145/88  HR - 57  TEMP - 98.3 °F (36.8 °C) (Oral)  02 SATS - 98%        DIAGNOSIS  Final diagnoses:   Syncope and collapse         DISPOSITION  Patient been to a monitored bed in the observation unit.          DICTATED UTILIZING DRAGON DICTATION     Neto Fam MD  09/07/22 4326

## 2022-09-03 ENCOUNTER — APPOINTMENT (OUTPATIENT)
Dept: CARDIOLOGY | Facility: HOSPITAL | Age: 37
End: 2022-09-03

## 2022-09-03 ENCOUNTER — READMISSION MANAGEMENT (OUTPATIENT)
Dept: CALL CENTER | Facility: HOSPITAL | Age: 37
End: 2022-09-03

## 2022-09-03 VITALS
DIASTOLIC BLOOD PRESSURE: 88 MMHG | RESPIRATION RATE: 16 BRPM | BODY MASS INDEX: 32.65 KG/M2 | TEMPERATURE: 98.3 F | HEART RATE: 57 BPM | OXYGEN SATURATION: 98 % | HEIGHT: 69 IN | SYSTOLIC BLOOD PRESSURE: 145 MMHG | WEIGHT: 220.46 LBS

## 2022-09-03 LAB
AORTIC DIMENSIONLESS INDEX: 0.8 (DI)
BH CV ECHO MEAS - AO MAX PG: 8.1 MMHG
BH CV ECHO MEAS - AO MEAN PG: 4.3 MMHG
BH CV ECHO MEAS - AO V2 MAX: 142.5 CM/SEC
BH CV ECHO MEAS - AO V2 VTI: 30 CM
BH CV ECHO MEAS - AVA(I,D): 3.9 CM2
BH CV ECHO MEAS - EDV(CUBED): 159 ML
BH CV ECHO MEAS - EDV(MOD-SP2): 128 ML
BH CV ECHO MEAS - EDV(MOD-SP4): 126 ML
BH CV ECHO MEAS - EF(MOD-BP): 66.3 %
BH CV ECHO MEAS - EF(MOD-SP2): 70.3 %
BH CV ECHO MEAS - EF(MOD-SP4): 61.9 %
BH CV ECHO MEAS - ESV(CUBED): 43.2 ML
BH CV ECHO MEAS - ESV(MOD-SP2): 38 ML
BH CV ECHO MEAS - ESV(MOD-SP4): 48 ML
BH CV ECHO MEAS - FS: 35.2 %
BH CV ECHO MEAS - IVS/LVPW: 1.15 CM
BH CV ECHO MEAS - IVSD: 1.03 CM
BH CV ECHO MEAS - LAT PEAK E' VEL: 19.1 CM/SEC
BH CV ECHO MEAS - LV DIASTOLIC VOL/BSA (35-75): 58.6 CM2
BH CV ECHO MEAS - LV MASS(C)D: 198.2 GRAMS
BH CV ECHO MEAS - LV MAX PG: 8 MMHG
BH CV ECHO MEAS - LV MEAN PG: 3.5 MMHG
BH CV ECHO MEAS - LV SYSTOLIC VOL/BSA (12-30): 22.3 CM2
BH CV ECHO MEAS - LV V1 MAX: 141.4 CM/SEC
BH CV ECHO MEAS - LV V1 VTI: 25 CM
BH CV ECHO MEAS - LVIDD: 5.4 CM
BH CV ECHO MEAS - LVIDS: 3.5 CM
BH CV ECHO MEAS - LVOT AREA: 4.7 CM2
BH CV ECHO MEAS - LVOT DIAM: 2.44 CM
BH CV ECHO MEAS - LVPWD: 0.9 CM
BH CV ECHO MEAS - MED PEAK E' VEL: 10.9 CM/SEC
BH CV ECHO MEAS - MV A DUR: 108 SEC
BH CV ECHO MEAS - MV A MAX VEL: 65.6 CM/SEC
BH CV ECHO MEAS - MV DEC SLOPE: 470.7 CM/SEC2
BH CV ECHO MEAS - MV DEC TIME: 174 MSEC
BH CV ECHO MEAS - MV E MAX VEL: 88.6 CM/SEC
BH CV ECHO MEAS - MV E/A: 1.35
BH CV ECHO MEAS - MV MAX PG: 5 MMHG
BH CV ECHO MEAS - MV MEAN PG: 1.26 MMHG
BH CV ECHO MEAS - MV P1/2T: 72.2 MSEC
BH CV ECHO MEAS - MV V2 VTI: 32.7 CM
BH CV ECHO MEAS - MVA(P1/2T): 3 CM2
BH CV ECHO MEAS - MVA(VTI): 3.6 CM2
BH CV ECHO MEAS - PA ACC TIME: 0.13 SEC
BH CV ECHO MEAS - PA PR(ACCEL): 20.8 MMHG
BH CV ECHO MEAS - PA V2 MAX: 109.6 CM/SEC
BH CV ECHO MEAS - QP/QS: 0.9
BH CV ECHO MEAS - RV MAX PG: 2.43 MMHG
BH CV ECHO MEAS - RV V1 MAX: 78 CM/SEC
BH CV ECHO MEAS - RV V1 VTI: 18.6 CM
BH CV ECHO MEAS - RVOT DIAM: 2.7 CM
BH CV ECHO MEAS - SI(MOD-SP2): 41.8 ML/M2
BH CV ECHO MEAS - SI(MOD-SP4): 36.3 ML/M2
BH CV ECHO MEAS - SV(LVOT): 116.9 ML
BH CV ECHO MEAS - SV(MOD-SP2): 90 ML
BH CV ECHO MEAS - SV(MOD-SP4): 78 ML
BH CV ECHO MEAS - SV(RVOT): 105.7 ML
BH CV ECHO MEASUREMENTS AVERAGE E/E' RATIO: 5.91
BH CV XLRA - RV BASE: 3.9 CM
BH CV XLRA - RV LENGTH: 7.8 CM
BH CV XLRA - RV MID: 3 CM
BH CV XLRA - TDI S': 17.1 CM/SEC
CHOLEST SERPL-MCNC: 226 MG/DL (ref 0–200)
HDLC SERPL-MCNC: 42 MG/DL (ref 40–60)
LDLC SERPL CALC-MCNC: 151 MG/DL (ref 0–100)
LDLC/HDLC SERPL: 3.51 {RATIO}
LEFT ATRIUM VOLUME INDEX: 24 ML/M2
MAXIMAL PREDICTED HEART RATE: 182 BPM
QT INTERVAL: 426 MS
SINUS: 3.2 CM
STRESS TARGET HR: 155 BPM
T4 FREE SERPL-MCNC: 1.33 NG/DL (ref 0.93–1.7)
TRIGL SERPL-MCNC: 183 MG/DL (ref 0–150)
TSH SERPL DL<=0.05 MIU/L-ACNC: 1.55 UIU/ML (ref 0.27–4.2)
VLDLC SERPL-MCNC: 33 MG/DL (ref 5–40)

## 2022-09-03 PROCEDURE — 93306 TTE W/DOPPLER COMPLETE: CPT | Performed by: INTERNAL MEDICINE

## 2022-09-03 PROCEDURE — 93005 ELECTROCARDIOGRAM TRACING: CPT | Performed by: INTERNAL MEDICINE

## 2022-09-03 PROCEDURE — 93246 EXT ECG>7D<15D RECORDING: CPT

## 2022-09-03 PROCEDURE — 80061 LIPID PANEL: CPT | Performed by: INTERNAL MEDICINE

## 2022-09-03 PROCEDURE — G0378 HOSPITAL OBSERVATION PER HR: HCPCS

## 2022-09-03 PROCEDURE — 93306 TTE W/DOPPLER COMPLETE: CPT

## 2022-09-03 PROCEDURE — 84443 ASSAY THYROID STIM HORMONE: CPT | Performed by: PHYSICIAN ASSISTANT

## 2022-09-03 PROCEDURE — 25010000002 PERFLUTREN (DEFINITY) 8.476 MG IN SODIUM CHLORIDE (PF) 0.9 % 10 ML INJECTION: Performed by: STUDENT IN AN ORGANIZED HEALTH CARE EDUCATION/TRAINING PROGRAM

## 2022-09-03 PROCEDURE — 93010 ELECTROCARDIOGRAM REPORT: CPT | Performed by: INTERNAL MEDICINE

## 2022-09-03 PROCEDURE — 99204 OFFICE O/P NEW MOD 45 MIN: CPT | Performed by: INTERNAL MEDICINE

## 2022-09-03 PROCEDURE — 84439 ASSAY OF FREE THYROXINE: CPT | Performed by: PHYSICIAN ASSISTANT

## 2022-09-03 RX ADMIN — Medication 10 ML: at 02:20

## 2022-09-03 RX ADMIN — PERFLUTREN 2 ML: 6.52 INJECTION, SUSPENSION INTRAVENOUS at 08:31

## 2022-09-03 NOTE — PLAN OF CARE
Goal Outcome Evaluation:  Patient being discharged home. Patient agreeable with discharge plan. Patient had zio patch placed. Patient to make follow up appointments.

## 2022-09-03 NOTE — PROGRESS NOTES
Pt presents to the ED c/o  syncopal episode earlier today, had felt nauseous and laid down and then 5 minutes later he woke up completely alert with no residual changes.  No incontinence, no biting of the tongue.  This was an unwitnessed event.  Questionable second episode last night while sleeping.  Denies any current complaints at this time.     On exam,   General: No acute distress, nontoxic, nondiaphoretic  HEENT: Mucous membranes moist, atraumatic, EOMI  Neck: Full ROM  Pulm: Symmetric chest rise, nonlabored, lungs CTAB  Cardiovascular: Regular rate and rhythm, intact distal pulses  GI: Soft, nontender, nondistended, no rebound, no guarding, bowel sounds present  MSK: Full ROM, no deformity  Skin: Warm, dry  Neuro: Awake, alert, oriented x 4, GCS 15, moving all extremities, no focal deficits  Psych: Calm, cooperative      N95, protective eye goggles, and gloves used during this encounter. Patient in surgical mask.      Plan: Plan for cardiology consult, reassuring work-up at this point do not feel strongly that this was seizure, I think this is more syncope, though etiology is not yet clear.  All questions and concerns addressed moving forward.       Attestation:  The BRANDON and I have discussed this patient's history, physical exam, and treatment plan.  I have reviewed the documentation and personally had a face to face interaction with the patient. I affirm the documentation and agree with the treatment and plan.  The attached note describes my personal findings.

## 2022-09-03 NOTE — DISCHARGE INSTRUCTIONS
Follow-up with your primary care provider to discuss lipid panel results.  You have been discharged with Zio patch, follow instructions for returning patch and follow-up with cardiology as discussed.  Return to the emergency department with worsening symptoms, uncontrolled pain, inability to tolerate oral liquids, fever greater than 101°F not controlled by Tylenol or as needed with emergent concerns.

## 2022-09-03 NOTE — OUTREACH NOTE
Prep Survey    Flowsheet Row Responses   [facility] patient discharged from? [place]   Is LACE score < 7 ? Yes   Emergency Room discharge w/ pulse ox? No   Eligibility [facility]   Date of Admission 09/02/22   Date of Discharge 09/03/22   Discharge Disposition Home or Self Care   Discharge diagnosis Syncope   Does the patient have one of the following disease processes/diagnoses(primary or secondary)? Other   Does the patient have Home health ordered? No   Is there a DME ordered? No   Prep survey completed? Yes          IVANA PERSAUD - Registered Nurse

## 2022-09-03 NOTE — PROGRESS NOTES
ED OBSERVATION PROGRESS/DISCHARGE SUMMARY    Date of Admission: 9/2/2022   LOS: 0 days   PCP: Amando Smith APRN    Subjective  Patient had episode of bradycardia and diaphoresis while getting IV and echocardiogram.  This episode resolved on its own after patient laid supine for about 5 minutes.  Patient states his resting heart rate is usually between 55 to 60 bpm.  This afternoon patient reports feeling well.  He states he exercises regularly.  Patient states he will follow-up with his PCP regarding lipid panel results, does not want to start statin at this time.  Patient denies chest pain, shortness of breath, palpitations, nausea, vomiting, diarrhea, fever, chills, recent sick contacts.    Hospital Outcome: 37-year-old male admitted to the observation unit for further evaluation of syncope.  CT head performed in ED is negative for intracranial abnormality.  Patient had echocardiogram which shows EF 66.3%, otherwise unremarkable.  Patient was seen by cardiology.  TSH and T4 levels normal.  He does have abnormal lipid panel with elevated total cholesterol, LDL, and triglycerides.  Patient will follow up with his PCP regarding lipid panel.  He had abnormal lipid panel back in January and was able to bring all of his numbers down with diet and exercise.  Patient will be discharged with Holter monitor and follow-up with cardiology.  Patient is in agreement with plan and expresses understanding.  All questions were answered to the best my ability.    ROS:  General: no fevers, chills  Respiratory: no cough, dyspnea  Cardiovascular: no chest pain, palpitations  Abdomen: No abdominal pain, nausea, vomiting, or diarrhea  Neurologic: No focal weakness    Objective   Physical Exam:  I have reviewed the vital signs.  Temp:  [97.8 °F (36.6 °C)-98.3 °F (36.8 °C)] 98.3 °F (36.8 °C)  Heart Rate:  [46-98] 57  Resp:  [16-18] 16  BP: ()/(49-98) 145/88  General Appearance:    Alert, cooperative, no distress  Head:     Normocephalic, atraumatic  Eyes:    Sclerae anicteric  Neck:   Supple, no mass  Lungs: Clear to auscultation bilaterally, respirations unlabored  Heart: Regular rate and rhythm, S1 and S2 normal, no murmur, rub or gallop  Abdomen:  Soft, non-tender, bowel sounds active, nondistended  Extremities: No clubbing, cyanosis, or edema to lower extremities  Pulses:  2+ and symmetric in distal lower extremities  Skin: No rashes   Neurologic: Oriented x3, Normal strength to extremities    Results Review:    I have reviewed the labs, radiology results and diagnostic studies.    Results from last 7 days   Lab Units 09/02/22  1338   WBC 10*3/mm3 6.24   HEMOGLOBIN g/dL 15.8   HEMATOCRIT % 45.3   PLATELETS 10*3/mm3 236     Results from last 7 days   Lab Units 09/02/22  1338   SODIUM mmol/L 142   POTASSIUM mmol/L 3.8   CHLORIDE mmol/L 101   CO2 mmol/L 25.3   BUN mg/dL 11   CREATININE mg/dL 0.93   CALCIUM mg/dL 9.4   BILIRUBIN mg/dL 1.0   ALK PHOS U/L 67   ALT (SGPT) U/L 26   AST (SGOT) U/L 22   GLUCOSE mg/dL 95     Imaging Results (Last 24 Hours)     Procedure Component Value Units Date/Time    CT Head Without Contrast [[id number]] Collected: 09/02/22 1628     Updated: 09/02/22 1628    Narrative:      CT HEAD WITHOUT CONTRAST     HISTORY: Syncope, dizziness.     COMPARISON: None.     FINDINGS: There is minimal asymmetry related to the lateral ventricles  with the right lateral ventricle being slightly larger than that of the  left. This is within normal limits in terms of variance. There is no  evidence of intracranial hemorrhage, hydrocephalus or of abnormal  extra-axial fluid. No focal area of decreased attenuation to suggest  acute infarction is identified. There is partial visualization of the  left maxillary sinus which is completely opacified. There is likely a  large mucus retention cyst involving the majority of the left maxillary  sinus. There is a partially empty sella of doubtful clinical  significance.       Impression:       1. No acute process identified. Further evaluation could be performed  with MRI examination of the brain as indicated.   2. There is a partially empty sella which is of doubtful clinical  significance.   3. There is partial visualization of the paranasal sinuses but a large  mucus retention cyst is present on the left.           Radiation dose reduction techniques were utilized, including automated  exposure control and exposure modulation based on body size.          XR Chest 2 View [[id number]] Collected: 09/02/22 1427     Updated: 09/02/22 1427    Narrative:      CHEST TWO VIEWS     HISTORY: Weakness, dizziness.     COMPARISON: None.     PA and lateral views of the chest demonstrate the heart to be within  normal limits in size. There is no evidence of infiltrate, effusion or  of congestive failure. Mild dextroscoliosis of the thoracic spine is  appreciated.           Echocardiogram 9/30/2022    Interpretation Summary    · Calculated left ventricular EF = 66.3% Estimated left ventricular EF was in agreement with the calculated left ventricular EF. Left ventricular systolic function is normal.  · Left ventricular diastolic function was normal.  · No significant valvular stenosis or regurgitation noted.      I have reviewed the medications.  ---------------------------------------------------------------------------------------------  Assessment & Plan   Assessment/Problem List    Syncope      Plan:    Syncope  -Patient is currently asymptomatic at this time  -Orthostatics negative in the ER  -CT head negative for acute process, was noted of a partially empty sella with doubtful clinical significance and a large mucous retention cyst in the left paranasal sinus, patient notified of the findings and advised for outpatient follow-up with PCP  -Chest x-ray negative for acute findings  -EKG normal sinus rhythm, no evidence of ischemia  -Cardiology consulted, please see their note  -Echocardiogram  reviewed  -Discharged with Zio patch, follow-up with cardiology    Dyslipidemia  -Follow-up with PCP  -Recommend diet and lifestyle changes    Disposition: Discharge home    Follow-up after Discharge: PCP, cardiology    This note will serve as discharge summary.    MESFIN Casillas 09/03/22 13:18 EDT             call Dr Lopez's office for follow up in 2 weeks.  It is advisable to follow up w/ your pcp in 2-3 weeks to be sure there are no underlying problems

## 2022-09-03 NOTE — PLAN OF CARE
Goal Outcome Evaluation:         Pt admitted for further evaluation of chest pain. NPO for Cardiology consult, plans for Echo today.

## 2022-09-03 NOTE — CONSULTS
Date of Hospital Visit: 2022  Date of consult: 22  Encounter Provider: Dillon Reardon MD  Place of Service: [facility] CARDIOLOGY  Patient Name: Nestor Jerome  :1985  Referral Provider: Dwight Paris MD    Chief complaint: syncope    Reason for consult: syncope    History of Present Illness  Nestor Jerome is a 37 year old male without a significant cardiac history. He has had previous reconstructive surgery of his knee. He has not previously been evaluated by cardiology.    He presented to the emergency department with complaints of syncope. Patient reports he has had episodes previously where he feels lightheaded and nauseous and lays down on the floor with his legs up until the episode passes. Prior to arrival, patient was working on his computer when he felt lightheaded and nauseous and laid on the ground. He believes he passed out, because five minutes had passed when he looked at his watch. He denies incontinence during episode, and woke up alert and asymptomatic. He denied chest pain, shortness of breath, or palpitations prior to episode.  He noted that he had difficulty sleeping the night prior to the episode and stayed up most of the night.   Vital signs were stable upon presentation.  EKG showed SR. Troponin was negative x 2. CXR negative for acute findings. Lab work and UA unremarkable.     This morning, while patient was getting his ECHO performed, he had another episode of pallor, diaphoresis, and nausea while having IV placed. His heart rate was noted as dropping into the low 30s on monitor, remained SB with P waves present. EKG was performed, which showed SB, rate 50. Episode resolved after patient laid supine for about five minutes. No loss of consciousness.     No past medical history on file.    Past Surgical History:   Procedure Laterality Date   • KNEE ARTHROSCOPY W/ ACL RECONSTRUCTION Left    • KNEE ARTHROSCOPY W/ DEBRIDEMENT Right   "      No medications prior to admission.       Current Meds  Scheduled Meds:sodium chloride, 10 mL, Intravenous, Q12H      Continuous Infusions:   PRN Meds:.•  nitroglycerin  •  sodium chloride  •  [COMPLETED] Insert peripheral IV **AND** sodium chloride  •  sodium chloride    Allergies as of 09/02/2022   • (No Known Allergies)       Social History     Socioeconomic History   • Marital status:    Tobacco Use   • Smoking status: Never Smoker   • Smokeless tobacco: Never Used   Vaping Use   • Vaping Use: Never used   Substance and Sexual Activity   • Alcohol use: Yes     Alcohol/week: 5.0 standard drinks     Types: 5 Cans of beer per week   • Drug use: Never   • Sexual activity: Yes     Partners: Female     Birth control/protection: None       Family History   Problem Relation Age of Onset   • Diabetes type II Mother    • Obesity Mother    • Stroke Father    • Arthritis Maternal Grandmother    • Breast cancer Maternal Grandmother    • Arthritis Paternal Grandmother    • Heart disease Maternal Grandfather        REVIEW OF SYSTEMS:   All systems reviewed and pertinent positives include in HPI otherwise negative review of systems.       Objective:   Temp:  [97.8 °F (36.6 °C)-98.2 °F (36.8 °C)] 98 °F (36.7 °C)  Heart Rate:  [46-98] 56  Resp:  [16-18] 16  BP: ()/(49-98) 141/91  Body mass index is 32.65 kg/m².  Flowsheet Rows    Flowsheet Row First Filed Value   Admission Height 175.3 cm (69\") Documented at 09/02/2022 1537   Admission Weight 100 kg (221 lb) Documented at 09/02/2022 1537        Vitals:    09/03/22 0851   BP: 141/91   Pulse: 56   Resp: 16   Temp: 98 °F (36.7 °C)   SpO2: 98%       General Appearance:    Alert, cooperative, in no acute distress   Head:    Normocephalic, without obvious abnormality, atraumatic   Eyes:            Lids and lashes normal, conjunctivae and sclerae normal, no   icterus, no pallor, corneas clear, PERRLA   Ears:    Ears appear intact with no abnormalities noted   Throat:   " No oral lesions, no thrush, oral mucosa moist   Neck:   No adenopathy, supple, trachea midline, no thyromegaly, no   carotid bruit, no JVD   Back:     No kyphosis present, no scoliosis present, no skin lesions, erythema or scars, no tenderness to percussion or palpation, range of motion normal   Lungs:     Clear to auscultation,respirations regular, even and unlabored    Heart:    Regular rhythm and normal rate, normal S1 and S2, no murmur, no gallop, no rub, no click   Chest Wall:    No abnormalities observed   Abdomen:     Normal bowel sounds, no masses, no organomegaly, soft nontender, nondistended, no guarding, no rebound  tenderness   Extremities:   Moves all extremities well, no edema, no cyanosis, no redness   Pulses:   Pulses palpable and equal bilaterally. Normal radial, carotid, femoral, dorsalis pedis and posterior tibial pulses bilaterally. Normal abdominal aorta   Skin:  Neurology:   Psychiatric:   No bleeding, bruising or rash   Normal speech and cranial nerve exam, no focal deficit   Alert and oriented x 3, normal mood and affect                 Review of Data:      Results from last 7 days   Lab Units 09/02/22  1338   SODIUM mmol/L 142   POTASSIUM mmol/L 3.8   CHLORIDE mmol/L 101   CO2 mmol/L 25.3   BUN mg/dL 11   CREATININE mg/dL 0.93   CALCIUM mg/dL 9.4   BILIRUBIN mg/dL 1.0   ALK PHOS U/L 67   ALT (SGPT) U/L 26   AST (SGOT) U/L 22   GLUCOSE mg/dL 95     Results from last 7 days   Lab Units 09/02/22  1535 09/02/22  1338   TROPONIN T ng/mL <0.010 <0.010     @LABRCNTbnp@  Results from last 7 days   Lab Units 09/02/22  1338   WBC 10*3/mm3 6.24   HEMOGLOBIN g/dL 15.8   HEMATOCRIT % 45.3   PLATELETS 10*3/mm3 236     Results from last 7 days   Lab Units 09/02/22  1338   INR  0.98     Results from last 7 days   Lab Units 09/02/22  1535   MAGNESIUM mg/dL 1.9     @LABRCNTIP(chol,trig,hdl,ldl)    EKG        I personally viewed and interpreted the patient's EKG/Telemetry data  )  Patient Active Problem List    Diagnosis   • Syncope         Assessment and Plan:      1.  Probably vasovagal syncope    He had presyncopal/syncopal episodes with obvious triggers in the past including needle insertion  He had similar while having echocardiogram today as he got scared and heart rate slowed to the 30s.  Recovered immediately  Normal thyroid function test  No events on telemetry  EKG with sinus bradycardia- he exercise regularly    2.  Mixed hyperlipidemia- check lipid panel and needs to  follow-up with PCP      I have discussed patient with ER team.  Okay to DC patient on Zio patch    Thank you for consulting with cardiology        Dillon Reardon MD  09/03/22  11:19 EDT.  Time spent in reviewing chart, discussion and examination:

## 2022-09-06 ENCOUNTER — TRANSITIONAL CARE MANAGEMENT TELEPHONE ENCOUNTER (OUTPATIENT)
Dept: CALL CENTER | Facility: HOSPITAL | Age: 37
End: 2022-09-06

## 2022-09-06 NOTE — OUTREACH NOTE
Call Center TCM Note    Flowsheet Row Responses   [facility] patient discharged from? [place]   Does the patient have one of the following disease processes/diagnoses(primary or secondary)? Other   TCM attempt successful? No   Unsuccessful attempts Attempt 1          Dwain Mccartney RN    9/6/2022, 13:13 EDT

## 2022-09-06 NOTE — OUTREACH NOTE
Call Center TCM Note    Flowsheet Row Responses   [facility] patient discharged from? [place]   Does the patient have one of the following disease processes/diagnoses(primary or secondary)? Other   TCM attempt successful? No   Unsuccessful attempts Attempt 2          Dwain Mccartney RN    9/6/2022, 13:47 EDT

## 2022-09-07 ENCOUNTER — TRANSITIONAL CARE MANAGEMENT TELEPHONE ENCOUNTER (OUTPATIENT)
Dept: CALL CENTER | Facility: HOSPITAL | Age: 37
End: 2022-09-07

## 2022-09-07 NOTE — OUTREACH NOTE
Call Center TCM Note    Flowsheet Row Responses   [facility] patient discharged from? [place]   Does the patient have one of the following disease processes/diagnoses(primary or secondary)? Other   TCM attempt successful? Yes   Call start time 1648   Call end time 1650   Discharge diagnosis Syncope   Meds reviewed with patient/caregiver? Yes   Does the patient have all medications ordered at discharge? N/A   Has home health visited the patient within 72 hours of discharge? N/A   Psychosocial issues? No   Did the patient receive a copy of their discharge instructions? Yes   Nursing interventions Reviewed instructions with patient   What is the patient's perception of their health status since discharge? Improving   Is the patient/caregiver able to teach back signs and symptoms related to disease process for when to call PCP? Yes   Is the patient/caregiver able to teach back signs and symptoms related to disease process for when to call 911? Yes   Is the patient/caregiver able to teach back the hierarchy of who to call/visit for symptoms/problems? PCP, Specialist, Home health nurse, Urgent Care, ED, 911 Yes   If the patient is a current smoker, are they able to teach back resources for cessation? Not a smoker   TCM call completed? Yes          Brie Giraldo LPN    9/7/2022, 16:51 EDT

## 2022-09-25 LAB
MAXIMAL PREDICTED HEART RATE: 182 BPM
STRESS TARGET HR: 155 BPM

## 2022-09-25 PROCEDURE — 93248 EXT ECG>7D<15D REV&INTERPJ: CPT | Performed by: INTERNAL MEDICINE

## 2022-09-26 NOTE — PROGRESS NOTES
Please notify patient that 2-week Holter monitor does not show any significant abnormality.  He can follow-up with his primary doctor but call us with any questions.  Let me know if he has any questions.  Thank you

## 2022-10-31 ENCOUNTER — TELEMEDICINE (OUTPATIENT)
Dept: INTERNAL MEDICINE | Age: 37
End: 2022-10-31

## 2022-10-31 DIAGNOSIS — U07.1 COVID-19: Primary | ICD-10-CM

## 2022-10-31 PROCEDURE — 99213 OFFICE O/P EST LOW 20 MIN: CPT

## 2022-10-31 NOTE — PROGRESS NOTES
I N T E R N A L  M E D I C I N E  ENRIQUE GUTIERREZ, APRN      ENCOUNTER DATE:  10/31/2022    Nestor Jerome / 37 y.o. / male        You have chosen to receive care through a telehealth visit.  Do you consent to use a video/audio connection for your medical care today? YES    Location of patient and provider are in [place]       CHIEF COMPLAINT / REASON FOR OFFICE VISIT     TELEHEALTH ENCOUNTER:  COVID-19 +      ASSESSMENT & PLAN     Diagnoses and all orders for this visit:    1. COVID-19 (Primary)          SUMMARY/DISCUSSION  • Discussed EUA of Paxlovid/ antibody infusion with pt, and he does not meet any of the high risk qualifications.  He is aware of importance of monitoring symptoms and visiting ER for any chest pain, dyspnea, oxygenation <92%.  • Educated on Mucinex DM and Tylenol PRN.  Remain hydrated, stay isolated for at least 5 days and while symptomatic.        Time spent: 15 minutes    Next Appointment with me: Visit date not found    No follow-ups on file.        HISTORY OF PRESENT ILLNESS     Tested positive for COVID-19 with home test yesterday.  Symptoms started yesterday with scratchy throat, mild fever, chills, nasal congestion, and non productive cough.  Denies chest pain, dyspnea, abdominal pain, n/v/d, loss of taste/ smell.  COVID-19 vaccinated plus one booster.          REVIEWED DATA     Labs:           Imaging:           Medical Tests:           Summary of old records / correspondence / consultant report:           Request outside records:         Template created by Devi Pope MD

## 2023-01-16 DIAGNOSIS — Z00.00 HEALTHCARE MAINTENANCE: ICD-10-CM

## 2023-01-16 DIAGNOSIS — Z00.00 HEALTHCARE MAINTENANCE: Primary | ICD-10-CM

## 2023-01-17 LAB
ALBUMIN SERPL-MCNC: 4.8 G/DL (ref 3.5–5.2)
ALBUMIN/GLOB SERPL: 2.2 G/DL
ALP SERPL-CCNC: 69 U/L (ref 39–117)
ALT SERPL-CCNC: 32 U/L (ref 1–41)
APPEARANCE UR: CLEAR
AST SERPL-CCNC: 23 U/L (ref 1–40)
BASOPHILS # BLD AUTO: 0.06 10*3/MM3 (ref 0–0.2)
BASOPHILS NFR BLD AUTO: 1.2 % (ref 0–1.5)
BILIRUB SERPL-MCNC: 0.9 MG/DL (ref 0–1.2)
BILIRUB UR QL STRIP: NEGATIVE
BUN SERPL-MCNC: 12 MG/DL (ref 6–20)
BUN/CREAT SERPL: 12.9 (ref 7–25)
CALCIUM SERPL-MCNC: 9.5 MG/DL (ref 8.6–10.5)
CHLORIDE SERPL-SCNC: 103 MMOL/L (ref 98–107)
CHOLEST SERPL-MCNC: 237 MG/DL (ref 0–200)
CHOLEST/HDLC SERPL: 5.64 {RATIO}
CO2 SERPL-SCNC: 29.4 MMOL/L (ref 22–29)
COLOR UR: YELLOW
CREAT SERPL-MCNC: 0.93 MG/DL (ref 0.76–1.27)
EGFRCR SERPLBLD CKD-EPI 2021: 107.8 ML/MIN/1.73
EOSINOPHIL # BLD AUTO: 0.46 10*3/MM3 (ref 0–0.4)
EOSINOPHIL NFR BLD AUTO: 8.9 % (ref 0.3–6.2)
ERYTHROCYTE [DISTWIDTH] IN BLOOD BY AUTOMATED COUNT: 13.6 % (ref 12.3–15.4)
GLOBULIN SER CALC-MCNC: 2.2 GM/DL
GLUCOSE SERPL-MCNC: 100 MG/DL (ref 65–99)
GLUCOSE UR QL STRIP: NEGATIVE
HBA1C MFR BLD: 5.2 % (ref 4.8–5.6)
HCT VFR BLD AUTO: 44.6 % (ref 37.5–51)
HDLC SERPL-MCNC: 42 MG/DL (ref 40–60)
HGB BLD-MCNC: 15.4 G/DL (ref 13–17.7)
HGB UR QL STRIP: NEGATIVE
IMM GRANULOCYTES # BLD AUTO: 0.01 10*3/MM3 (ref 0–0.05)
IMM GRANULOCYTES NFR BLD AUTO: 0.2 % (ref 0–0.5)
KETONES UR QL STRIP: ABNORMAL
LDLC SERPL CALC-MCNC: 151 MG/DL (ref 0–100)
LEUKOCYTE ESTERASE UR QL STRIP: NEGATIVE
LYMPHOCYTES # BLD AUTO: 1.4 10*3/MM3 (ref 0.7–3.1)
LYMPHOCYTES NFR BLD AUTO: 27 % (ref 19.6–45.3)
MCH RBC QN AUTO: 31.8 PG (ref 26.6–33)
MCHC RBC AUTO-ENTMCNC: 34.5 G/DL (ref 31.5–35.7)
MCV RBC AUTO: 92 FL (ref 79–97)
MONOCYTES # BLD AUTO: 0.44 10*3/MM3 (ref 0.1–0.9)
MONOCYTES NFR BLD AUTO: 8.5 % (ref 5–12)
NEUTROPHILS # BLD AUTO: 2.81 10*3/MM3 (ref 1.7–7)
NEUTROPHILS NFR BLD AUTO: 54.2 % (ref 42.7–76)
NITRITE UR QL STRIP: NEGATIVE
NRBC BLD AUTO-RTO: 0 /100 WBC (ref 0–0.2)
PH UR STRIP: 6 [PH] (ref 5–8)
PLATELET # BLD AUTO: 284 10*3/MM3 (ref 140–450)
POTASSIUM SERPL-SCNC: 4.4 MMOL/L (ref 3.5–5.2)
PROT SERPL-MCNC: 7 G/DL (ref 6–8.5)
PROT UR QL STRIP: ABNORMAL
RBC # BLD AUTO: 4.85 10*6/MM3 (ref 4.14–5.8)
SODIUM SERPL-SCNC: 139 MMOL/L (ref 136–145)
SP GR UR STRIP: 1.03 (ref 1–1.03)
T4 FREE SERPL-MCNC: 1.2 NG/DL (ref 0.93–1.7)
TRIGL SERPL-MCNC: 240 MG/DL (ref 0–150)
TSH SERPL DL<=0.005 MIU/L-ACNC: 2.53 UIU/ML (ref 0.27–4.2)
UROBILINOGEN UR STRIP-MCNC: ABNORMAL MG/DL
VLDLC SERPL CALC-MCNC: 44 MG/DL (ref 5–40)
WBC # BLD AUTO: 5.18 10*3/MM3 (ref 3.4–10.8)

## 2023-01-24 ENCOUNTER — OFFICE VISIT (OUTPATIENT)
Dept: INTERNAL MEDICINE | Age: 38
End: 2023-01-24
Payer: COMMERCIAL

## 2023-01-24 VITALS
DIASTOLIC BLOOD PRESSURE: 86 MMHG | TEMPERATURE: 96.9 F | HEIGHT: 69 IN | OXYGEN SATURATION: 99 % | BODY MASS INDEX: 33.92 KG/M2 | HEART RATE: 66 BPM | WEIGHT: 229 LBS | SYSTOLIC BLOOD PRESSURE: 142 MMHG

## 2023-01-24 DIAGNOSIS — R03.0 ELEVATED BLOOD PRESSURE READING: ICD-10-CM

## 2023-01-24 DIAGNOSIS — E78.5 HYPERLIPIDEMIA, UNSPECIFIED HYPERLIPIDEMIA TYPE: ICD-10-CM

## 2023-01-24 DIAGNOSIS — Z00.00 ENCOUNTER FOR ANNUAL PHYSICAL EXAM: Primary | ICD-10-CM

## 2023-01-24 DIAGNOSIS — Z23 NEED FOR HEPATITIS A IMMUNIZATION: ICD-10-CM

## 2023-01-24 DIAGNOSIS — E66.9 CLASS 1 OBESITY WITHOUT SERIOUS COMORBIDITY WITH BODY MASS INDEX (BMI) OF 33.0 TO 33.9 IN ADULT, UNSPECIFIED OBESITY TYPE: ICD-10-CM

## 2023-01-24 PROCEDURE — 99212 OFFICE O/P EST SF 10 MIN: CPT | Performed by: NURSE PRACTITIONER

## 2023-01-24 PROCEDURE — 90471 IMMUNIZATION ADMIN: CPT | Performed by: NURSE PRACTITIONER

## 2023-01-24 PROCEDURE — 90632 HEPA VACCINE ADULT IM: CPT | Performed by: NURSE PRACTITIONER

## 2023-01-24 PROCEDURE — 99395 PREV VISIT EST AGE 18-39: CPT | Performed by: NURSE PRACTITIONER

## 2023-01-24 NOTE — PROGRESS NOTES
[place] [place] [facility]  [facility]      Nestor Queenie / 37 y.o. / male  01/24/2023                        VITALS     Visit Vitals  /86   Pulse 66   Temp 96.9 °F (36.1 °C) (Temporal)   Ht 175 cm (68.9\")   Wt 104 kg (229 lb)   SpO2 99%   BMI 33.92 kg/m²       BP Readings from Last 3 Encounters:   01/24/23 142/86   09/03/22 145/88   01/21/22 116/82     Wt Readings from Last 3 Encounters:   01/24/23 104 kg (229 lb)   09/03/22 100 kg (220 lb 7.4 oz)   01/21/22 105 kg (231 lb 9.6 oz)      Body mass index is 33.92 kg/m².    MEDICATIONS     No current outpatient medications on file.     Current Facility-Administered Medications   Medication Dose Route Frequency Provider Last Rate Last Admin   • hepatitis A (HAVRIX) vaccine 1,440 Units  1,440 Units Intramuscular During Hospitalization Amando Smith APRN         _____________________________________________________________________________________    CHIEF COMPLAINT     Annual Exam, Obesity, and Hyperlipidemia      HISTORY OF PRESENT ILLNESS      Nestor presents for annual health maintenance visit.    · Last health maintenance visit: approximately 1 year ago  · General health: good  · Lifestyle:  · Attempting to lose weight?: Yes   · Diet: eats moderately healthy  · Exercise: walks regularly and exercises 3 days/week  · Tobacco: Never used   · Alcohol: occasional/infrequent  · Work: Full-time  · Reproductive health:  · Sexually active?: Yes   · Concern for STD?: No   · Sexual problems?: No problems   · Sees Urologist?: No   · Depression Screening:      PHQ-2/PHQ-9 Depression Screening 1/24/2023   Retired PHQ-9 Total Score -   Retired Total Score -   Little Interest or Pleasure in Doing Things 0-->not at all   Feeling Down, Depressed or Hopeless 0-->not at all   PHQ-9: Brief Depression Severity Measure Score 0         PHQ-2: 0 (Not depressed)     PHQ-9: 0 (Negative screening for depression)    Patient Care Team:  Amando Smith APRN as PCP - General (Internal " Medicine)  ______________________________________________________________________    ALLERGIES  No Known Allergies     PFSH:     The following portions of the patient's history were reviewed and updated as appropriate: Allergies / Current Medications / Past Medical History / Surgical History / Social History / Family History    PROBLEM LIST   Patient Active Problem List   Diagnosis   • Syncope       PAST MEDICAL HISTORY  Past Medical History:   Diagnosis Date   • Depression 2001    After death of father.       SURGICAL HISTORY  Past Surgical History:   Procedure Laterality Date   • KNEE ARTHROSCOPY W/ ACL RECONSTRUCTION Left 2001   • KNEE ARTHROSCOPY W/ DEBRIDEMENT Right        SOCIAL HISTORY  Social History     Socioeconomic History   • Marital status:    Tobacco Use   • Smoking status: Never   • Smokeless tobacco: Never   Vaping Use   • Vaping Use: Never used   Substance and Sexual Activity   • Alcohol use: Yes     Alcohol/week: 5.0 standard drinks     Types: 5 Cans of beer per week   • Drug use: Never   • Sexual activity: Yes     Partners: Female     Birth control/protection: None       FAMILY HISTORY  Family History   Problem Relation Age of Onset   • Diabetes type II Mother    • Obesity Mother    • Depression Mother    • Diabetes Mother    • Stroke Father    • Arthritis Maternal Grandmother    • Breast cancer Maternal Grandmother    • Cancer Maternal Grandmother    • Arthritis Paternal Grandmother    • Hearing loss Paternal Grandmother    • Other Paternal Grandmother         Epilepsy   • Heart disease Maternal Grandfather        IMMUNIZATION HISTORY  Immunization History   Administered Date(s) Administered   • COVID-19 (MODERNA) 1st, 2nd, 3rd Dose Only 03/23/2021, 04/20/2021, 11/22/2021   • FluLaval/Fluzone >6mos 12/07/2021   • TD Preservative Free 01/21/2022       ______________________________________________________________________    REVIEW OF SYSTEMS     Review of Systems   Constitutional: Negative.     HENT: Negative.    Eyes: Negative.    Respiratory: Negative.    Cardiovascular: Negative.    Gastrointestinal: Negative.    Endocrine: Negative.    Genitourinary: Negative.    Musculoskeletal: Negative.    Skin: Negative.    Allergic/Immunologic: Negative.    Neurological: Negative.    Hematological: Negative.    Psychiatric/Behavioral: Negative.      PHYSICAL EXAMINATION     Physical Exam  Constitutional:       Appearance: Normal appearance. He is obese.   HENT:      Head: Normocephalic and atraumatic.      Right Ear: Tympanic membrane normal.      Left Ear: Tympanic membrane normal.      Nose: Nose normal. No congestion.      Mouth/Throat:      Mouth: Mucous membranes are moist.      Pharynx: Oropharynx is clear.   Eyes:      Conjunctiva/sclera: Conjunctivae normal.      Pupils: Pupils are equal, round, and reactive to light.   Neck:      Vascular: No carotid bruit.   Cardiovascular:      Rate and Rhythm: Normal rate and regular rhythm.      Pulses: Normal pulses.      Heart sounds: Normal heart sounds.   Pulmonary:      Effort: Pulmonary effort is normal.      Breath sounds: Normal breath sounds.   Abdominal:      General: There is no distension.      Palpations: Abdomen is soft.      Tenderness: There is no abdominal tenderness. There is no right CVA tenderness, left CVA tenderness or guarding.   Genitourinary:     Comments: Defer BRIANNA until 49, neg family history of prostate cancer, no  symptoms   Musculoskeletal:         General: Normal range of motion.      Cervical back: Normal range of motion.      Right lower leg: No edema.      Left lower leg: No edema.   Lymphadenopathy:      Cervical: No cervical adenopathy.   Skin:     General: Skin is warm and dry.   Neurological:      Mental Status: He is alert and oriented to person, place, and time.      Cranial Nerves: No cranial nerve deficit.      Motor: No weakness.      Gait: Gait normal.   Psychiatric:         Mood and Affect: Mood normal.          Behavior: Behavior normal.         Thought Content: Thought content normal.         Judgment: Judgment normal.        REVIEWED DATA      Labs:    Lab Results   Component Value Date     01/17/2023    K 4.4 01/17/2023    CALCIUM 9.5 01/17/2023    AST 23 01/17/2023    ALT 32 01/17/2023    BUN 12 01/17/2023    CREATININE 0.93 01/17/2023    CREATININE 0.93 09/02/2022    CREATININE 0.99 01/21/2022    EGFRIFNONA 97 01/21/2022    EGFRIFAFRI 112 01/21/2022       Lab Results   Component Value Date    HGBA1C 5.20 01/17/2023    HGBA1C 5.3 01/21/2022    TSH 2.530 01/17/2023    FREET4 1.20 01/17/2023       No results found for: PSA, TESTOSTEROTT, TESTFRE    Lab Results   Component Value Date     (H) 01/17/2023    HDL 42 01/17/2023    TRIG 240 (H) 01/17/2023    CHOLHDLRATIO 5.64 01/17/2023       No components found for: [id number]    Lab Results   Component Value Date    WBC 5.18 01/17/2023    HGB 15.4 01/17/2023    MCV 92.0 01/17/2023     01/17/2023       Lab Results   Component Value Date    PROTEIN Trace (A) 01/17/2023    GLUCOSEU Negative 01/17/2023    BLOODU Negative 01/17/2023    NITRITEU Negative 01/17/2023    LEUKOCYTESUR Negative 01/17/2023          Lab Results   Component Value Date    HEPCVIRUSABY <0.1 01/21/2022       Imaging:           Medical Tests:       ______________________________________________________________________    ASSESSMENT & PLAN     ANNUAL WELLNESS EXAM / PHYSICAL     Other medical problems addressed today:  BMI 33.92, walking 3 times weekly, hiking on the weekends.  Diet moderately healthy.  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  Blood pressure is also up today, typically normal range.  He does have a blood pressure cuff at home.  No chest pain, shortness of breath, lower extremity swelling or palpitations.  Syncope and collapse since last visit, cardiology work-up was benign with  Holter monitor predominant rhythm sinus rhythm, echocardiogram within normal.  His LDL was 151 with triglycerides of 240 fasting labs.    Summary/Discussion:     · Administer hepatitis A vaccine today follow-up 6 months to repeat lipid panel along with second hepatitis A vaccine  · Obesity/hyperlipidemia: Decrease/eliminate soda, caffeine, alcohol and overall caloric intake. Reduce carbohydrates and sweets in diet.  Continue to improve dietary habits with lean proteins, fresh vegetables, fruits, and nuts. Improve aerobic exercise: walking/biking/swimming daily as tolerated, recommend 30 minutes/day at least 5 days/week.  · Elevated blood pressure reading, increased rest, patient will check blood pressure once daily with no caffeine report readings in 1 week via [place].  Typically patient is in normal range, increased stress.  · Follow-up in 1 year for annual physical    Next Appointment with me: Visit date not found    Return in about 6 months (around 7/24/2023) for lab only and MA/LPN for HEP A; 1 year annual .      HEALTHCARE MAINTENANCE ISSUES       Cancer Screening:  · Colon: Initial/Next screening at age: 44  · Repeat colon cancer screening: N/A at this time  · Prostate: PSA 50   · Testicular: Recommended monthly self exam  · Skin: Monthly self skin examination, annual exam by health professional  · Lung: Does not meet criteria for lung cancer screening.   · Other:    Screening Labs & Tests:  · Lab results reviewed & discussed with with patient or orders placed today.  · EKG:  · CV Screening: Lipid panel  · DEXA (75+ or risk factors):   · HEP C (If born [id number] or risk factors): Negative screen  · Other:     Immunization/Vaccinations (to be given today unless deferred by patient)  · Influenza: Recommended annual influenza vaccine  · Hepatitis A: Administer today  · Tetanus/Pertussis: Up to date  · Pneumovax: Not needed at this time  · Shingles: Not needed at this time  · Other: recommended moderna bivalent      Lifestyle Counseling:  · Lifestyle Modifications: Improve dietary compliance and Increase intensity/regularity of aerobic exercise  · Safety Issues: Always wear seatbelt, Avoid texting while driving   · Use sunscreen, regular skin examination  · Recommended annual dental/vision examination.  · Emotional/Stress/Sleep: Reviewed and  given when appropriate      Health Maintenance   Topic Date Due   • COVID-19 Vaccine (4 - Booster for Moderna series) 02/12/2023 (Originally 1/17/2022)   • INFLUENZA VACCINE  03/31/2023 (Originally 8/1/2022)   • LIPID PANEL  01/17/2024   • ANNUAL PHYSICAL  01/24/2024   • TDAP/TD VACCINES (2 - Tdap) 01/21/2032   • HEPATITIS C SCREENING  Completed   • Pneumococcal Vaccine 0-64  Aged Out         Examiner was wearing surgical mask    **Dragon dictation used for documentation

## 2023-02-27 ENCOUNTER — HOSPITAL ENCOUNTER (OUTPATIENT)
Dept: GENERAL RADIOLOGY | Facility: HOSPITAL | Age: 38
Discharge: HOME | End: 2023-02-27
Admitting: NURSE PRACTITIONER
Payer: COMMERCIAL

## 2023-02-27 ENCOUNTER — OFFICE VISIT (OUTPATIENT)
Dept: INTERNAL MEDICINE | Age: 38
End: 2023-02-27
Payer: COMMERCIAL

## 2023-02-27 VITALS
TEMPERATURE: 97.1 F | HEART RATE: 80 BPM | HEIGHT: 69 IN | DIASTOLIC BLOOD PRESSURE: 86 MMHG | OXYGEN SATURATION: 99 % | WEIGHT: 226.2 LBS | SYSTOLIC BLOOD PRESSURE: 142 MMHG | BODY MASS INDEX: 33.5 KG/M2

## 2023-02-27 DIAGNOSIS — Z82.49 FAMILY HISTORY OF HEART DISEASE: ICD-10-CM

## 2023-02-27 DIAGNOSIS — R06.09 DYSPNEA ON EXERTION: ICD-10-CM

## 2023-02-27 DIAGNOSIS — R94.31 EKG ABNORMALITIES: Primary | ICD-10-CM

## 2023-02-27 DIAGNOSIS — I10 PRIMARY HYPERTENSION: ICD-10-CM

## 2023-02-27 DIAGNOSIS — E78.5 HYPERLIPIDEMIA, UNSPECIFIED HYPERLIPIDEMIA TYPE: ICD-10-CM

## 2023-02-27 PROCEDURE — 71046 X-RAY EXAM CHEST 2 VIEWS: CPT

## 2023-02-27 PROCEDURE — 93000 ELECTROCARDIOGRAM COMPLETE: CPT | Performed by: NURSE PRACTITIONER

## 2023-02-27 PROCEDURE — 91312 COVID-19 (PFIZER) BIVALENT BOOSTER 12+YRS: CPT | Performed by: NURSE PRACTITIONER

## 2023-02-27 PROCEDURE — 0124A PR ADM SARSCOV2 30MCG/0.3ML BST: CPT | Performed by: NURSE PRACTITIONER

## 2023-02-27 PROCEDURE — 99213 OFFICE O/P EST LOW 20 MIN: CPT | Performed by: NURSE PRACTITIONER

## 2023-02-27 RX ORDER — LISINOPRIL 10 MG/1
10 TABLET ORAL DAILY
Qty: 30 TABLET | Refills: 2 | Status: SHIPPED | OUTPATIENT
Start: 2023-02-27 | End: 2023-03-28

## 2023-02-27 NOTE — PROGRESS NOTES
"    I N T E R N A L  M E D I C I N E  SHELIA SHELTON, APRN      ENCOUNTER DATE:  02/27/2023    Nestor Jerome / 37 y.o. / male      CHIEF COMPLAINT / REASON FOR OFFICE VISIT     Hypertension      ASSESSMENT & PLAN     Problem List Items Addressed This Visit    None  Visit Diagnoses     EKG abnormalities    -  Primary    Relevant Orders    Stress test with myocardial perfusion    Dyspnea on exertion        Relevant Orders    Stress test with myocardial perfusion    XR Chest PA & Lateral    Family history of heart disease        Relevant Orders    Stress test with myocardial perfusion    Hyperlipidemia, unspecified hyperlipidemia type        Relevant Orders    Stress test with myocardial perfusion    Primary hypertension        Relevant Medications    lisinopril (PRINIVIL,ZESTRIL) 10 MG tablet    Other Relevant Orders    Stress test with myocardial perfusion        Orders Placed This Encounter   Procedures   • XR Chest PA & Lateral   • COVID-19 Bivalent Booster ([employer]) 12+yrs   • Stress test with myocardial perfusion     New Medications Ordered This Visit   Medications   • lisinopril (PRINIVIL,ZESTRIL) 10 MG tablet     Sig: Take 1 tablet by mouth Daily.     Dispense:  30 tablet     Refill:  2       SUMMARY/DISCUSSION  • Recommend recording blood pressure at least daily, follow-up with blood pressures consistently elevated above 140/80 prior to one month follow-up    Next Appointment with me: Visit date not found    Return for Next scheduled follow up 1-2 months .      VITAL SIGNS     Visit Vitals  /86 (Cuff Size: Adult)   Pulse 80   Temp 97.1 °F (36.2 °C) (Temporal)   Ht 175 cm (68.9\")   Wt 103 kg (226 lb 3.2 oz)   SpO2 99%   BMI 33.50 kg/m²     Wt Readings from Last 3 Encounters:   02/27/23 103 kg (226 lb 3.2 oz)   01/24/23 104 kg (229 lb)   09/03/22 100 kg (220 lb 7.4 oz)     Body mass index is 33.5 kg/m².      MEDICATIONS AT THE TIME OF OFFICE VISIT     No current outpatient medications on file prior to visit. "     No current facility-administered medications on file prior to visit.          HISTORY OF PRESENT ILLNESS     Patient presents with rising blood pressure consistently in the 140s over 80s at home with no pinpointed chest pain but dyspnea on exertion with family history of heart disease along with elevated LDL in the 150s.  No lower extremity swelling, no cough, no felt heart palpitations.  EKG today showed nonspecific lateral T wave changes    REVIEW OF SYSTEMS     Constitutional neg except per HPI   Resp dyspnea on exertion   CV neg    PHYSICAL EXAMINATION     Physical Exam  Constitutional  No distress  Cardiovascular Rate  normal . Rhythm: regular . Heart sounds:  normal  Pulmonary/Chest  Effort normal. Breath sounds:  normal  Psychiatric  Alert. Judgment and thought content normal. Mood normal     REVIEWED DATA     Labs:     Lab Results   Component Value Date    GLUCOSE 100 (H) 01/17/2023    BUN 12 01/17/2023    CREATININE 0.93 01/17/2023    EGFRRESULT 107.8 01/17/2023    EGFR 107.8 09/02/2022    BCR 12.9 01/17/2023    K 4.4 01/17/2023    CO2 29.4 (H) 01/17/2023    CALCIUM 9.5 01/17/2023    PROTENTOTREF 7.0 01/17/2023    ALBUMIN 4.8 01/17/2023    LABIL2 2.2 01/17/2023    AST 23 01/17/2023    ALT 32 01/17/2023     Lab Results   Component Value Date    WBC 5.18 01/17/2023    HGB 15.4 01/17/2023    HCT 44.6 01/17/2023    MCV 92.0 01/17/2023     01/17/2023     Lab Results   Component Value Date    TSH 2.530 01/17/2023     Lab Results   Component Value Date    CHOL 226 (H) 09/03/2022    CHLPL 237 (H) 01/17/2023    TRIG 240 (H) 01/17/2023    HDL 42 01/17/2023     (H) 01/17/2023     Lab Results   Component Value Date    HGBA1C 5.20 01/17/2023         Imaging:           Medical Tests:       ECG 12 Lead    Date/Time: 2/27/2023 4:16 PM  Performed by: Amando Smith APRN  Authorized by: Amando Smith APRN   Rhythm: sinus rhythm  Conduction: conduction normal  Other findings comments: Nonspecific lateral T  wave changes    Clinical impression: non-specific ECG              Summary of old records / correspondence / consultant report:           Request outside records:             *Examiner was wearing medical surgical mask.   **Dragon dictation used for documentation.

## 2023-03-17 ENCOUNTER — HOSPITAL ENCOUNTER (OUTPATIENT)
Dept: NUCLEAR MEDICINE | Facility: HOSPITAL | Age: 38
Discharge: HOME | End: 2023-03-17
Payer: COMMERCIAL

## 2023-03-17 DIAGNOSIS — E78.5 HYPERLIPIDEMIA, UNSPECIFIED HYPERLIPIDEMIA TYPE: ICD-10-CM

## 2023-03-17 DIAGNOSIS — R06.09 DYSPNEA ON EXERTION: ICD-10-CM

## 2023-03-17 DIAGNOSIS — R94.31 EKG ABNORMALITIES: ICD-10-CM

## 2023-03-17 DIAGNOSIS — I10 PRIMARY HYPERTENSION: ICD-10-CM

## 2023-03-17 DIAGNOSIS — Z82.49 FAMILY HISTORY OF HEART DISEASE: ICD-10-CM

## 2023-03-17 LAB
BH CV REST NUCLEAR ISOTOPE DOSE: 10.5 MCI
BH CV STRESS BP STAGE 1: NORMAL
BH CV STRESS BP STAGE 2: NORMAL
BH CV STRESS BP STAGE 3: NORMAL
BH CV STRESS BP STAGE 4: NORMAL
BH CV STRESS DURATION MIN STAGE 1: 3
BH CV STRESS DURATION MIN STAGE 2: 3
BH CV STRESS DURATION MIN STAGE 3: 3
BH CV STRESS DURATION MIN STAGE 4: 1
BH CV STRESS DURATION SEC STAGE 1: 0
BH CV STRESS DURATION SEC STAGE 2: 0
BH CV STRESS DURATION SEC STAGE 3: 0
BH CV STRESS DURATION SEC STAGE 4: 15
BH CV STRESS GRADE STAGE 1: 10
BH CV STRESS GRADE STAGE 2: 12
BH CV STRESS GRADE STAGE 3: 14
BH CV STRESS GRADE STAGE 4: 16
BH CV STRESS HR STAGE 1: 119
BH CV STRESS HR STAGE 2: 138
BH CV STRESS HR STAGE 3: 164
BH CV STRESS HR STAGE 4: 176
BH CV STRESS METS STAGE 1: 5
BH CV STRESS METS STAGE 2: 7.5
BH CV STRESS METS STAGE 3: 10
BH CV STRESS METS STAGE 4: 13.5
BH CV STRESS NUCLEAR ISOTOPE DOSE: 33.5 MCI
BH CV STRESS PROTOCOL 1: NORMAL
BH CV STRESS RECOVERY BP: NORMAL MMHG
BH CV STRESS RECOVERY HR: 106 BPM
BH CV STRESS SPEED STAGE 1: 1.7
BH CV STRESS SPEED STAGE 2: 2.5
BH CV STRESS SPEED STAGE 3: 3.4
BH CV STRESS SPEED STAGE 4: 4.2
BH CV STRESS STAGE 1: 1
BH CV STRESS STAGE 2: 2
BH CV STRESS STAGE 3: 3
BH CV STRESS STAGE 4: 4
LV EF NUC BP: 62 %
MAXIMAL PREDICTED HEART RATE: 182 BPM
PERCENT MAX PREDICTED HR: 96.7 %
STRESS BASELINE BP: NORMAL MMHG
STRESS BASELINE HR: 66 BPM
STRESS PERCENT HR: 114 %
STRESS POST ESTIMATED WORKLOAD: 12.2 METS
STRESS POST EXERCISE DUR MIN: 10 MIN
STRESS POST EXERCISE DUR SEC: 16 SEC
STRESS POST PEAK BP: NORMAL MMHG
STRESS POST PEAK HR: 176 BPM
STRESS TARGET HR: 155 BPM

## 2023-03-17 PROCEDURE — 78452 HT MUSCLE IMAGE SPECT MULT: CPT | Performed by: INTERNAL MEDICINE

## 2023-03-17 PROCEDURE — A9500 TC99M SESTAMIBI: HCPCS | Performed by: NURSE PRACTITIONER

## 2023-03-17 PROCEDURE — 93017 CV STRESS TEST TRACING ONLY: CPT

## 2023-03-17 PROCEDURE — 93016 CV STRESS TEST SUPVJ ONLY: CPT | Performed by: INTERNAL MEDICINE

## 2023-03-17 PROCEDURE — 78452 HT MUSCLE IMAGE SPECT MULT: CPT

## 2023-03-17 PROCEDURE — 93018 CV STRESS TEST I&R ONLY: CPT | Performed by: INTERNAL MEDICINE

## 2023-03-17 PROCEDURE — 0 TECHNETIUM SESTAMIBI: Performed by: NURSE PRACTITIONER

## 2023-03-17 RX ADMIN — TECHNETIUM TC 99M SESTAMIBI 1 DOSE: 1 INJECTION INTRAVENOUS at 07:00

## 2023-03-28 ENCOUNTER — OFFICE VISIT (OUTPATIENT)
Dept: INTERNAL MEDICINE | Age: 38
End: 2023-03-28
Payer: COMMERCIAL

## 2023-03-28 VITALS
HEART RATE: 76 BPM | DIASTOLIC BLOOD PRESSURE: 86 MMHG | WEIGHT: 226.2 LBS | TEMPERATURE: 96.8 F | OXYGEN SATURATION: 99 % | HEIGHT: 69 IN | BODY MASS INDEX: 33.5 KG/M2 | SYSTOLIC BLOOD PRESSURE: 126 MMHG

## 2023-03-28 DIAGNOSIS — I10 PRIMARY HYPERTENSION: Primary | ICD-10-CM

## 2023-03-28 PROCEDURE — 99213 OFFICE O/P EST LOW 20 MIN: CPT | Performed by: NURSE PRACTITIONER

## 2023-03-28 RX ORDER — LISINOPRIL 20 MG/1
20 TABLET ORAL DAILY
Qty: 90 TABLET | Refills: 3
Start: 2023-03-28

## 2023-03-28 NOTE — PROGRESS NOTES
"    I N T E R N A L  M E D I C I N E  SHELIA SHELTON, APRN      ENCOUNTER DATE:  03/28/2023    Nestor Jerome / 37 y.o. / male      CHIEF COMPLAINT / REASON FOR OFFICE VISIT     Hypertension      ASSESSMENT & PLAN     Problem List Items Addressed This Visit    None  Visit Diagnoses     Primary hypertension    -  Primary    Relevant Medications    lisinopril (PRINIVIL,ZESTRIL) 20 MG tablet    Other Relevant Orders    Basic metabolic panel        Orders Placed This Encounter   Procedures   • Basic metabolic panel     New Medications Ordered This Visit   Medications   • lisinopril (PRINIVIL,ZESTRIL) 20 MG tablet     Sig: Take 1 tablet by mouth Daily.     Dispense:  90 tablet     Refill:  3       SUMMARY/DISCUSSION  • We will recheck kidney function and electrolytes with initiating of lisinopril 20 mg but continue regimen at current dose as he is having significant improvement in symptoms.     Next Appointment with me: Visit date not found    Return for cancel lab appointment 07/24 and schedule office visit instead; within in 2 weeks lab appointment .      VITAL SIGNS     Visit Vitals  /86 (Cuff Size: Adult)   Pulse 76   Temp 96.8 °F (36 °C) (Temporal)   Ht 175 cm (68.9\")   Wt 103 kg (226 lb 3.2 oz)   SpO2 99%   BMI 33.50 kg/m²       Wt Readings from Last 3 Encounters:   03/28/23 103 kg (226 lb 3.2 oz)   02/27/23 103 kg (226 lb 3.2 oz)   01/24/23 104 kg (229 lb)     Body mass index is 33.5 kg/m².      MEDICATIONS AT THE TIME OF OFFICE VISIT     Current Outpatient Medications on File Prior to Visit   Medication Sig   • [DISCONTINUED] lisinopril (PRINIVIL,ZESTRIL) 10 MG tablet Take 1 tablet by mouth Daily. (Patient taking differently: Take 2 tablets by mouth Daily.)     No current facility-administered medications on file prior to visit.          HISTORY OF PRESENT ILLNESS     Patient presented at last office visit approximately one month prior with rising blood pressure consistently in the 140s over 80s at home with " no pinpointed chest pain but dyspnea on exertion with family history of heart disease along with elevated LDL in the 150s.  No lower extremity swelling, no cough, no felt heart palpitations.  EKG today showed nonspecific lateral T wave changes.  Stress test was low risk with no noted ischemia.  He was placed on lisinopril 20 mg with blood pressures now in the 120s over 80s here and at home.  No longer having dyspnea on exertion.    REVIEW OF SYSTEMS     Constitutional neg except per HPI   Resp neg  CV neg    PHYSICAL EXAMINATION     Physical Exam  Constitutional  No distress  Cardiovascular Rate  normal . Rhythm: regular . Heart sounds:  normal  Pulmonary/Chest  Effort normal. Breath sounds:  normal  Psychiatric  Alert. Judgment and thought content normal. Mood normal     REVIEWED DATA     Labs:   Lab Results   Component Value Date    GLUCOSE 100 (H) 01/17/2023    BUN 12 01/17/2023    CREATININE 0.93 01/17/2023    EGFRRESULT 107.8 01/17/2023    EGFR 107.8 09/02/2022    BCR 12.9 01/17/2023    K 4.4 01/17/2023    CO2 29.4 (H) 01/17/2023    CALCIUM 9.5 01/17/2023    PROTENTOTREF 7.0 01/17/2023    ALBUMIN 4.8 01/17/2023    BILITOT 0.9 01/17/2023    AST 23 01/17/2023    ALT 32 01/17/2023     Lab Results   Component Value Date    TSH 2.530 01/17/2023     Lab Results   Component Value Date    CHOL 226 (H) 09/03/2022    CHLPL 237 (H) 01/17/2023    TRIG 240 (H) 01/17/2023    HDL 42 01/17/2023     (H) 01/17/2023     Lab Results   Component Value Date    WBC 5.18 01/17/2023    HGB 15.4 01/17/2023    HCT 44.6 01/17/2023    MCV 92.0 01/17/2023     01/17/2023     Lab Results   Component Value Date    HGBA1C 5.20 01/17/2023     Brief Urine Lab Results  (Last result in the past 365 days)      Color   Clarity   Blood   Leuk Est   Nitrite   Protein   CREAT   Urine HCG        01/17/23 0750 Yellow  Comment: Urine microscopic not indicated.  REFERENCE RANGE: Yellow, Straw     Clear   Negative   Negative   Negative    Trace               Imaging:           Medical Tests:           Summary of old records / correspondence / consultant report:           Request outside records:         **Dragon dictation used for documentation.

## 2023-04-28 DIAGNOSIS — I10 PRIMARY HYPERTENSION: ICD-10-CM

## 2023-04-28 RX ORDER — LISINOPRIL 20 MG/1
20 TABLET ORAL DAILY
Qty: 90 TABLET | Refills: 3
Start: 2023-04-28

## 2023-05-02 ENCOUNTER — TELEPHONE (OUTPATIENT)
Dept: INTERNAL MEDICINE | Age: 38
End: 2023-05-02
Payer: COMMERCIAL

## 2023-05-02 DIAGNOSIS — I10 PRIMARY HYPERTENSION: ICD-10-CM

## 2023-05-02 RX ORDER — LISINOPRIL 20 MG/1
20 TABLET ORAL DAILY
Qty: 90 TABLET | Refills: 3 | Status: SHIPPED | OUTPATIENT
Start: 2023-05-02

## 2023-05-02 RX ORDER — LISINOPRIL 20 MG/1
20 TABLET ORAL DAILY
Qty: 90 TABLET | Refills: 3
Start: 2023-05-02 | End: 2023-05-02 | Stop reason: SDUPTHER

## 2023-07-24 ENCOUNTER — OFFICE VISIT (OUTPATIENT)
Dept: INTERNAL MEDICINE | Age: 38
End: 2023-07-24
Payer: COMMERCIAL

## 2023-07-24 VITALS
SYSTOLIC BLOOD PRESSURE: 132 MMHG | OXYGEN SATURATION: 99 % | WEIGHT: 221.4 LBS | HEIGHT: 69 IN | DIASTOLIC BLOOD PRESSURE: 82 MMHG | TEMPERATURE: 96.9 F | BODY MASS INDEX: 32.79 KG/M2 | HEART RATE: 71 BPM

## 2023-07-24 DIAGNOSIS — Z00.00 ROUTINE ADULT HEALTH MAINTENANCE: ICD-10-CM

## 2023-07-24 DIAGNOSIS — E78.5 HYPERLIPIDEMIA, UNSPECIFIED HYPERLIPIDEMIA TYPE: ICD-10-CM

## 2023-07-24 DIAGNOSIS — I10 PRIMARY HYPERTENSION: Primary | ICD-10-CM

## 2023-07-24 PROCEDURE — 90471 IMMUNIZATION ADMIN: CPT | Performed by: NURSE PRACTITIONER

## 2023-07-24 PROCEDURE — 90632 HEPA VACCINE ADULT IM: CPT | Performed by: NURSE PRACTITIONER

## 2023-07-24 PROCEDURE — 99213 OFFICE O/P EST LOW 20 MIN: CPT | Performed by: NURSE PRACTITIONER

## 2023-07-24 RX ORDER — LISINOPRIL 20 MG/1
20 TABLET ORAL DAILY
Qty: 90 TABLET | Refills: 3
Start: 2023-07-24

## 2023-07-24 NOTE — PROGRESS NOTES
"    I N T E R N A L  M E D I C I N E  SHELIA SHELTON, APRN      ENCOUNTER DATE:  07/24/2023    Nestor Jerome / 38 y.o. / male      CHIEF COMPLAINT / REASON FOR OFFICE VISIT     Hypertension      ASSESSMENT & PLAN     Problem List Items Addressed This Visit    None  Visit Diagnoses       Primary hypertension    -  Primary    Relevant Medications    lisinopril (PRINIVIL,ZESTRIL) 20 MG tablet    Other Relevant Orders    Comprehensive Metabolic Panel    Hyperlipidemia, unspecified hyperlipidemia type        Relevant Orders    Lipid Panel With / Chol / HDL Ratio    Routine adult health maintenance        Relevant Orders    Comprehensive Metabolic Panel    CBC w AUTO Differential    TSH+Free T4    Hemoglobin A1c    Urinalysis With Microscopic If Indicated (No Culture) - Urine, Clean Catch    Lipid Panel With / Chol / HDL Ratio          Orders Placed This Encounter   Procedures    Hepatitis A Vaccine Adult IM    Comprehensive Metabolic Panel    Lipid Panel With / Chol / HDL Ratio    Comprehensive Metabolic Panel    TSH+Free T4    Hemoglobin A1c    Urinalysis With Microscopic If Indicated (No Culture) - Urine, Clean Catch    Lipid Panel With / Chol / HDL Ratio    CBC w AUTO Differential     New Medications Ordered This Visit   Medications    lisinopril (PRINIVIL,ZESTRIL) 20 MG tablet     Sig: Take 1 tablet by mouth Daily.     Dispense:  90 tablet     Refill:  3       SUMMARY/DISCUSSION  Patient will follow-up in February as scheduled for annual physical we will schedule labs 1 week prior.    Next Appointment with me: Visit date not found    Return for Next scheduled follow up please schedule lab appointment today for the physical .      VITAL SIGNS     Visit Vitals  /82 (Cuff Size: Adult)   Pulse 71   Temp 96.9 °F (36.1 °C) (Temporal)   Ht 175 cm (68.9\")   Wt 100 kg (221 lb 6.4 oz)   SpO2 99%   BMI 32.79 kg/m²     Wt Readings from Last 3 Encounters:   07/24/23 100 kg (221 lb 6.4 oz)   03/28/23 103 kg (226 lb 3.2 oz) "   02/27/23 103 kg (226 lb 3.2 oz)     Body mass index is 32.79 kg/m².      MEDICATIONS AT THE TIME OF OFFICE VISIT     Current Outpatient Medications on File Prior to Visit   Medication Sig    [DISCONTINUED] lisinopril (PRINIVIL,ZESTRIL) 20 MG tablet Take 1 tablet by mouth Daily.     No current facility-administered medications on file prior to visit.          HISTORY OF PRESENT ILLNESS     Patient presented at last office visit approximately one month prior with rising blood pressure consistently in the 140s over 80s at home with no pinpointed chest pain but dyspnea on exertion with family history of heart disease along with elevated LDL in the 150s.  No lower extremity swelling, no cough, no felt heart palpitations.  EKG today showed nonspecific lateral T wave changes.  Stress test was low risk with no noted ischemia.  He was placed on lisinopril 20 mg with blood pressures now in the 120s over 80s here and at home.  No longer having dyspnea on exertion.     REVIEW OF SYSTEMS     Constitutional neg except per HPI   Resp neg  CV neg     PHYSICAL EXAMINATION     Physical Exam  Constitutional  No distress  Cardiovascular Rate  normal . Rhythm: regular . Heart sounds:  normal  Pulmonary/Chest  Effort normal. Breath sounds:  normal  Psychiatric  Alert. Judgment and thought content normal. Mood normal      REVIEWED DATA     Labs:   Lab Results   Component Value Date    GLUCOSE 99 04/12/2023    BUN 12 04/12/2023    CREATININE 0.89 04/12/2023    EGFRRESULT 111.8 04/12/2023    EGFR 107.8 09/02/2022    BCR 13.5 04/12/2023    K 4.7 04/12/2023    CO2 26.5 04/12/2023    CALCIUM 9.7 04/12/2023    PROTENTOTREF 7.0 01/17/2023    ALBUMIN 4.8 01/17/2023    BILITOT 0.9 01/17/2023    AST 23 01/17/2023    ALT 32 01/17/2023      Imaging:           Medical Tests:           Summary of old records / correspondence / consultant report:           Request outside records:             *Dragon dictation used for documentation.

## 2023-07-25 LAB
ALBUMIN SERPL-MCNC: 4.7 G/DL (ref 4.1–5.1)
ALBUMIN/GLOB SERPL: 2 {RATIO} (ref 1.2–2.2)
ALP SERPL-CCNC: 74 IU/L (ref 44–121)
ALT SERPL-CCNC: 27 IU/L (ref 0–44)
AST SERPL-CCNC: 21 IU/L (ref 0–40)
BILIRUB SERPL-MCNC: 0.9 MG/DL (ref 0–1.2)
BUN SERPL-MCNC: 13 MG/DL (ref 6–20)
BUN/CREAT SERPL: 14 (ref 9–20)
CALCIUM SERPL-MCNC: 9.5 MG/DL (ref 8.7–10.2)
CHLORIDE SERPL-SCNC: 101 MMOL/L (ref 96–106)
CHOLEST SERPL-MCNC: 231 MG/DL (ref 100–199)
CHOLEST/HDLC SERPL: 5.3 RATIO (ref 0–5)
CO2 SERPL-SCNC: 24 MMOL/L (ref 20–29)
CREAT SERPL-MCNC: 0.94 MG/DL (ref 0.76–1.27)
EGFRCR SERPLBLD CKD-EPI 2021: 106 ML/MIN/1.73
GLOBULIN SER CALC-MCNC: 2.3 G/DL (ref 1.5–4.5)
GLUCOSE SERPL-MCNC: 82 MG/DL (ref 70–99)
HDLC SERPL-MCNC: 44 MG/DL
LDLC SERPL CALC-MCNC: 146 MG/DL (ref 0–99)
POTASSIUM SERPL-SCNC: 4.4 MMOL/L (ref 3.5–5.2)
PROT SERPL-MCNC: 7 G/DL (ref 6–8.5)
SODIUM SERPL-SCNC: 140 MMOL/L (ref 134–144)
TRIGL SERPL-MCNC: 223 MG/DL (ref 0–149)
VLDLC SERPL CALC-MCNC: 41 MG/DL (ref 5–40)

## 2023-11-16 ENCOUNTER — APPOINTMENT (OUTPATIENT)
Dept: GENERAL RADIOLOGY | Facility: HOSPITAL | Age: 38
End: 2023-11-16
Payer: COMMERCIAL

## 2023-11-16 ENCOUNTER — HOSPITAL ENCOUNTER (EMERGENCY)
Facility: HOSPITAL | Age: 38
Discharge: HOME | End: 2023-11-16
Attending: EMERGENCY MEDICINE
Payer: COMMERCIAL

## 2023-11-16 VITALS
WEIGHT: 218 LBS | DIASTOLIC BLOOD PRESSURE: 85 MMHG | OXYGEN SATURATION: 99 % | TEMPERATURE: 97.2 F | HEART RATE: 65 BPM | BODY MASS INDEX: 32.29 KG/M2 | HEIGHT: 69 IN | SYSTOLIC BLOOD PRESSURE: 131 MMHG | RESPIRATION RATE: 16 BRPM

## 2023-11-16 DIAGNOSIS — R55 SYNCOPE, UNSPECIFIED SYNCOPE TYPE: Primary | ICD-10-CM

## 2023-11-16 LAB
ALBUMIN SERPL-MCNC: 4.7 G/DL (ref 3.5–5.2)
ALBUMIN/GLOB SERPL: 1.9 G/DL
ALP SERPL-CCNC: 69 U/L (ref 39–117)
ALT SERPL W P-5'-P-CCNC: 30 U/L (ref 1–41)
ANION GAP SERPL CALCULATED.3IONS-SCNC: 8.4 MMOL/L (ref 5–15)
AST SERPL-CCNC: 20 U/L (ref 1–40)
BASOPHILS # BLD AUTO: 0.05 10*3/MM3 (ref 0–0.2)
BASOPHILS NFR BLD AUTO: 0.6 % (ref 0–1.5)
BILIRUB SERPL-MCNC: 0.9 MG/DL (ref 0–1.2)
BUN SERPL-MCNC: 15 MG/DL (ref 6–20)
BUN/CREAT SERPL: 16.1 (ref 7–25)
CALCIUM SPEC-SCNC: 9.1 MG/DL (ref 8.6–10.5)
CHLORIDE SERPL-SCNC: 103 MMOL/L (ref 98–107)
CO2 SERPL-SCNC: 27.6 MMOL/L (ref 22–29)
CREAT SERPL-MCNC: 0.93 MG/DL (ref 0.76–1.27)
DEPRECATED RDW RBC AUTO: 43.9 FL (ref 37–54)
EGFRCR SERPLBLD CKD-EPI 2021: 107.1 ML/MIN/1.73
EOSINOPHIL # BLD AUTO: 0.17 10*3/MM3 (ref 0–0.4)
EOSINOPHIL NFR BLD AUTO: 2.1 % (ref 0.3–6.2)
ERYTHROCYTE [DISTWIDTH] IN BLOOD BY AUTOMATED COUNT: 13.1 % (ref 12.3–15.4)
GLOBULIN UR ELPH-MCNC: 2.5 GM/DL
GLUCOSE SERPL-MCNC: 91 MG/DL (ref 65–99)
HCT VFR BLD AUTO: 44.1 % (ref 37.5–51)
HGB BLD-MCNC: 15.7 G/DL (ref 13–17.7)
IMM GRANULOCYTES # BLD AUTO: 0.03 10*3/MM3 (ref 0–0.05)
IMM GRANULOCYTES NFR BLD AUTO: 0.4 % (ref 0–0.5)
LYMPHOCYTES # BLD AUTO: 1.28 10*3/MM3 (ref 0.7–3.1)
LYMPHOCYTES NFR BLD AUTO: 15.8 % (ref 19.6–45.3)
MCH RBC QN AUTO: 32.3 PG (ref 26.6–33)
MCHC RBC AUTO-ENTMCNC: 35.6 G/DL (ref 31.5–35.7)
MCV RBC AUTO: 90.7 FL (ref 79–97)
MONOCYTES # BLD AUTO: 0.63 10*3/MM3 (ref 0.1–0.9)
MONOCYTES NFR BLD AUTO: 7.8 % (ref 5–12)
NEUTROPHILS NFR BLD AUTO: 5.94 10*3/MM3 (ref 1.7–7)
NEUTROPHILS NFR BLD AUTO: 73.3 % (ref 42.7–76)
NRBC BLD AUTO-RTO: 0 /100 WBC (ref 0–0.2)
PLATELET # BLD AUTO: 260 10*3/MM3 (ref 140–450)
PMV BLD AUTO: 9.3 FL (ref 6–12)
POTASSIUM SERPL-SCNC: 4.2 MMOL/L (ref 3.5–5.2)
PROT SERPL-MCNC: 7.2 G/DL (ref 6–8.5)
QT INTERVAL: 386 MS
QTC INTERVAL: 386 MS
RBC # BLD AUTO: 4.86 10*6/MM3 (ref 4.14–5.8)
SODIUM SERPL-SCNC: 139 MMOL/L (ref 136–145)
TROPONIN T SERPL HS-MCNC: 7 NG/L
TROPONIN T SERPL HS-MCNC: 8 NG/L
TSH SERPL DL<=0.05 MIU/L-ACNC: 2.95 UIU/ML (ref 0.27–4.2)
WBC NRBC COR # BLD AUTO: 8.1 10*3/MM3 (ref 3.4–10.8)

## 2023-11-16 PROCEDURE — 80050 GENERAL HEALTH PANEL: CPT | Performed by: EMERGENCY MEDICINE

## 2023-11-16 PROCEDURE — 93010 ELECTROCARDIOGRAM REPORT: CPT | Performed by: INTERNAL MEDICINE

## 2023-11-16 PROCEDURE — 93005 ELECTROCARDIOGRAM TRACING: CPT | Performed by: EMERGENCY MEDICINE

## 2023-11-16 PROCEDURE — 84484 ASSAY OF TROPONIN QUANT: CPT | Performed by: EMERGENCY MEDICINE

## 2023-11-16 PROCEDURE — 99284 EMERGENCY DEPT VISIT MOD MDM: CPT

## 2023-11-16 PROCEDURE — 71045 X-RAY EXAM CHEST 1 VIEW: CPT

## 2023-11-16 PROCEDURE — 36415 COLL VENOUS BLD VENIPUNCTURE: CPT

## 2023-11-16 RX ORDER — SODIUM CHLORIDE 0.9 % (FLUSH) 0.9 %
10 SYRINGE (ML) INJECTION AS NEEDED
Status: DISCONTINUED | OUTPATIENT
Start: 2023-11-16 | End: 2023-11-16 | Stop reason: HOSPADM

## 2023-11-16 RX ORDER — LISINOPRIL 20 MG/1
20 TABLET ORAL ONCE
Status: DISCONTINUED | OUTPATIENT
Start: 2023-11-16 | End: 2023-11-16

## 2023-11-16 NOTE — ED NOTES
"Patient to ER via car from home for \"I have the taste of adrenaline in my mouth\"    Patient reports he was sitting at work and felt like he was going to pass out so he laid in the floor    No other complaints at time of triage  "

## 2023-11-16 NOTE — DISCHARGE INSTRUCTIONS
Return immediately for any chest pain, shortness of breath, or further episodes of passing out.  Follow-up with your primary care doctor and the cardiologist for further evaluation.

## 2023-11-16 NOTE — ED PROVIDER NOTES
EMERGENCY DEPARTMENT ENCOUNTER    Room Number:  35/35  PCP: Amando Smith APRN  Patient Care Team:  Amando Smith APRN as PCP - General (Internal Medicine)   Independent Historians: Patient    HPI:  Chief Complaint: Syncope    A complete HPI/ROS/PMH/PSH/SH/FH are unobtainable due to: Nothing    Chronic or social conditions impacting patient care (Social Determinants of Health): None  (Financial Resource Strain / Food Insecurity / Transportation Needs / Physical Activity / Stress / Social Connections / Intimate Partner Violence / Housing Stability)    Context: Nestor Jerome is a 38 y.o. male who presents to the ED c/o acute syncopal episode.  The patient reports that he was at home working when he suddenly felt like he was going to pass out.  He states he tasted adrenaline in his mouth.  He laid down on the floor.  He states he did not actually fully passed out.  He reports he had no chest pain or shortness of breath.  He has no headache.  He has no weakness or numbness.  He states the same episode is occurred 1 time in the past and he was admitted to this hospital a little more than a year ago.  He states at that time they told him to pay attention to similarities and any future syncopal episodes.  He states that he believes last year and this episode both occurred when he was hearing [place] code over the ham radio.  He states he currently feels completely back to normal.  He forgot to take his lisinopril last night.    Review of prior external notes (non-ED) -and- Review of prior external test results outside of this encounter: Neurology note dated 9/3/2022 with a probable vasovagal syncopal episode.  They note prior syncopal episodes in the past.  Zio patch was negative for arrhythmias.    Prescription drug monitoring program review:         PAST MEDICAL HISTORY  Active Ambulatory Problems     Diagnosis Date Noted    Syncope 09/02/2022     Resolved Ambulatory Problems     Diagnosis Date Noted    Dizziness  09/02/2022     Past Medical History:   Diagnosis Date    Depression 2001    Hypertension          PAST SURGICAL HISTORY  Past Surgical History:   Procedure Laterality Date    KNEE ARTHROSCOPY W/ ACL RECONSTRUCTION Left 2001    KNEE ARTHROSCOPY W/ DEBRIDEMENT Right          FAMILY HISTORY  Family History   Problem Relation Age of Onset    Diabetes type II Mother     Obesity Mother     Depression Mother     Diabetes Mother     Stroke Father     Arthritis Maternal Grandmother     Breast cancer Maternal Grandmother     Cancer Maternal Grandmother     Arthritis Paternal Grandmother     Hearing loss Paternal Grandmother     Other Paternal Grandmother         Epilepsy    Heart disease Maternal Grandfather          SOCIAL HISTORY  Social History     Socioeconomic History    Marital status:    Tobacco Use    Smoking status: Never    Smokeless tobacco: Never   Vaping Use    Vaping Use: Never used   Substance and Sexual Activity    Alcohol use: Yes     Alcohol/week: 5.0 standard drinks of alcohol     Types: 5 Cans of beer per week    Drug use: Never    Sexual activity: Yes     Partners: Female     Birth control/protection: None         ALLERGIES  Patient has no known allergies.        REVIEW OF SYSTEMS  Review of Systems  Included in HPI  All systems reviewed and negative except for those discussed in HPI.      PHYSICAL EXAM    I have reviewed the triage vital signs and nursing notes.    ED Triage Vitals   Temp Heart Rate Resp BP SpO2   11/16/23 1213 11/16/23 1213 11/16/23 1213 11/16/23 1218 11/16/23 1213   97.2 °F (36.2 °C) 67 16 (!) 153/106 97 %      Temp src Heart Rate Source Patient Position BP Location FiO2 (%)   -- -- -- -- --              Physical Exam  GENERAL: Awake, alert, no acute distress  SKIN: Warm, dry  HENT: Normocephalic, atraumatic  EYES: no scleral icterus  CV: regular rhythm, regular rate  RESPIRATORY: normal effort, lungs clear  ABDOMEN: soft, nontender, nondistended  MUSCULOSKELETAL: no deformity  no calf tenderness or swelling  NEURO: alert, moves all extremities, follows commands                                                               LAB RESULTS  Recent Results (from the past 24 hour(s))   ECG 12 Lead Syncope    Collection Time: 11/16/23 12:32 PM   Result Value Ref Range    QT Interval 386 ms    QTC Interval 386 ms   Comprehensive Metabolic Panel    Collection Time: 11/16/23 12:51 PM    Specimen: Blood   Result Value Ref Range    Glucose 91 65 - 99 mg/dL    BUN 15 6 - 20 mg/dL    Creatinine 0.93 0.76 - 1.27 mg/dL    Sodium 139 136 - 145 mmol/L    Potassium 4.2 3.5 - 5.2 mmol/L    Chloride 103 98 - 107 mmol/L    CO2 27.6 22.0 - 29.0 mmol/L    Calcium 9.1 8.6 - 10.5 mg/dL    Total Protein 7.2 6.0 - 8.5 g/dL    Albumin 4.7 3.5 - 5.2 g/dL    ALT (SGPT) 30 1 - 41 U/L    AST (SGOT) 20 1 - 40 U/L    Alkaline Phosphatase 69 39 - 117 U/L    Total Bilirubin 0.9 0.0 - 1.2 mg/dL    Globulin 2.5 gm/dL    A/G Ratio 1.9 g/dL    BUN/Creatinine Ratio 16.1 7.0 - 25.0    Anion Gap 8.4 5.0 - 15.0 mmol/L    eGFR 107.1 >60.0 mL/min/1.73   CBC Auto Differential    Collection Time: 11/16/23 12:51 PM    Specimen: Blood   Result Value Ref Range    WBC 8.10 3.40 - 10.80 10*3/mm3    RBC 4.86 4.14 - 5.80 10*6/mm3    Hemoglobin 15.7 13.0 - 17.7 g/dL    Hematocrit 44.1 37.5 - 51.0 %    MCV 90.7 79.0 - 97.0 fL    MCH 32.3 26.6 - 33.0 pg    MCHC 35.6 31.5 - 35.7 g/dL    RDW 13.1 12.3 - 15.4 %    RDW-SD 43.9 37.0 - 54.0 fl    MPV 9.3 6.0 - 12.0 fL    Platelets 260 140 - 450 10*3/mm3    Neutrophil % 73.3 42.7 - 76.0 %    Lymphocyte % 15.8 (L) 19.6 - 45.3 %    Monocyte % 7.8 5.0 - 12.0 %    Eosinophil % 2.1 0.3 - 6.2 %    Basophil % 0.6 0.0 - 1.5 %    Immature Grans % 0.4 0.0 - 0.5 %    Neutrophils, Absolute 5.94 1.70 - 7.00 10*3/mm3    Lymphocytes, Absolute 1.28 0.70 - 3.10 10*3/mm3    Monocytes, Absolute 0.63 0.10 - 0.90 10*3/mm3    Eosinophils, Absolute 0.17 0.00 - 0.40 10*3/mm3    Basophils, Absolute 0.05 0.00 - 0.20 10*3/mm3     Immature Grans, Absolute 0.03 0.00 - 0.05 10*3/mm3    nRBC 0.0 0.0 - 0.2 /100 WBC   High Sensitivity Troponin T    Collection Time: 11/16/23 12:51 PM    Specimen: Blood   Result Value Ref Range    HS Troponin T 8 <22 ng/L   TSH    Collection Time: 11/16/23 12:51 PM    Specimen: Blood   Result Value Ref Range    TSH 2.950 0.270 - 4.200 uIU/mL   Single High Sensitivity Troponin T    Collection Time: 11/16/23  3:04 PM    Specimen: Blood   Result Value Ref Range    HS Troponin T 7 <22 ng/L       I ordered the above labs and independently reviewed the results.        RADIOLOGY  XR Chest 1 View    Result Date: 11/16/2023  XR CHEST 1 VW-  HISTORY: Male who is 38 years-old, syncope  TECHNIQUE: Frontal view of the chest  COMPARISON: 2/27/2023, 9/2/2022  FINDINGS: Heart, mediastinum and pulmonary vasculature are unremarkable. Vague opacity in the left suprahilar region appears stable from prior exams, may be a result of overlapping blood vessels, could be further characterized with CT exam as indicated, continued follow-up to characterize continued stability. No pleural effusion, or pneumothorax. No acute osseous process.      As described.    This report was finalized on 11/16/2023 12:50 PM by Dr. Lyndon Franklin M.D on Workstation: [id number]       I ordered the above noted radiological studies. Reviewed by me and discussed with radiologist.  See dictation for official radiology interpretation.      PROCEDURES    Procedures      MEDICATIONS GIVEN IN ER    Medications   sodium chloride 0.9 % flush 10 mL (has no administration in time range)         ORDERS PLACED DURING THIS VISIT:  Orders Placed This Encounter   Procedures    XR Chest 1 View    Comprehensive Metabolic Panel    CBC Auto Differential    High Sensitivity Troponin T    TSH    Single High Sensitivity Troponin T    Monitor Blood Pressure    Orthostatic Vitals    Pulse Oximetry, Continuous    ECG 12 Lead Syncope    Insert Peripheral IV    CBC & Differential          PROGRESS, DATA ANALYSIS, CONSULTS, AND MEDICAL DECISION MAKING    All labs have been independently interpreted by me.  All radiology studies have been reviewed by me and discussed with radiologist dictating the report.   EKG's independently viewed and interpreted by me.  Discussion below represents my analysis of pertinent findings related to patient's condition, differential diagnosis, treatment plan and final disposition.    Differential diagnosis includes but is not limited to acute coronary syndrome, acute aortic syndrome, PE, pneumothorax, unstable angina, arrhythmia, seizure.    Clinical Scores:    [unfilled]         ED Course as of 11/16/23 1542   Thu Nov 16, 2023   1241 XR Chest 1 View  My independent interpretation of the chest x-ray is no pneumothorax [TR]   1242 EKG          EKG time: 1232  Rhythm/Rate: Normal sinus, rate 60  P waves and KS: Normal P, normal KS  QRS, axis: Narrow QRS, normal axis  ST and T waves: No acute    Independently Interpreted by me  Not significantly changed compared to prior 9/3/2022   [TR]   1340 BP: 126/89 [TR]   1356 I reviewed the work-up and findings with the patient at the bedside.  Answered all questions.  He remains without any chest pain or shortness of breath and no further symptoms.  We are pending second troponin.  If it is negative I believe he will be able to be discharged home with outpatient follow-up.  He is agreeable. [TR]   1426 Orthostatics are negative [TR]   1536 Delta troponin is -1.  No significant change.  Plan discharge home. [TR]   1541 I reviewed with the patient.  He is agreeable to discharge home with outpatient follow-up. [TR]      ED Course User Index  [TR] Rogelio Garcia MD                     AS OF 15:42 EST VITALS:    BP - 126/89  HR - 65  TEMP - 97.2 °F (36.2 °C)  O2 SATS - 99%        DIAGNOSIS  Final diagnoses:   Syncope, unspecified syncope type         DISPOSITION  ED Disposition       ED Disposition   Discharge    Condition    Stable    Comment   --                  Note Disclaimer: At [facility], we believe that sharing information builds trust and better relationships. You are receiving this note because you recently visited [facility]. It is possible you will see health information before a provider has talked with you about it. This kind of information can be easy to misunderstand. To help you fully understand what it means for your health, we urge you to discuss this note with your provider.         Rogelio Garcia MD  11/16/23 1536       Rogelio Garcia MD  11/16/23 0292

## 2023-11-17 ENCOUNTER — TELEPHONE (OUTPATIENT)
Dept: INTERNAL MEDICINE | Age: 38
End: 2023-11-17
Payer: COMMERCIAL

## 2023-11-17 NOTE — TELEPHONE ENCOUNTER
Attempted to call pt to set up hospital follow up, number was unavailable and couldn't leave voicemail. Pt wanted to discuss next steps of vagal response issue. Phone number [phone] wasn't available.    Consent (Ear)/Introductory Paragraph: The rationale for Mohs was explained to the patient and consent was obtained. The risks, benefits and alternatives to therapy were discussed in detail. Specifically, the risks of ear deformity, infection, scarring, bleeding, prolonged wound healing, incomplete removal, allergy to anesthesia, nerve injury and recurrence were addressed. Prior to the procedure, the treatment site was clearly identified and confirmed by the patient. All components of Universal Protocol/PAUSE Rule completed.

## 2024-02-05 ENCOUNTER — OFFICE VISIT (OUTPATIENT)
Dept: INTERNAL MEDICINE | Age: 39
End: 2024-02-05
Payer: COMMERCIAL

## 2024-02-05 VITALS
DIASTOLIC BLOOD PRESSURE: 82 MMHG | OXYGEN SATURATION: 99 % | HEIGHT: 69 IN | WEIGHT: 224.6 LBS | TEMPERATURE: 98 F | BODY MASS INDEX: 33.27 KG/M2 | HEART RATE: 66 BPM | SYSTOLIC BLOOD PRESSURE: 128 MMHG

## 2024-02-05 DIAGNOSIS — Z23 NEED FOR COVID-19 VACCINE: ICD-10-CM

## 2024-02-05 DIAGNOSIS — Z23 NEED FOR VACCINATION: ICD-10-CM

## 2024-02-05 DIAGNOSIS — Z00.00 ENCOUNTER FOR ANNUAL PHYSICAL EXAM: Primary | ICD-10-CM

## 2024-02-05 PROCEDURE — 90471 IMMUNIZATION ADMIN: CPT | Performed by: NURSE PRACTITIONER

## 2024-02-05 PROCEDURE — 90686 IIV4 VACC NO PRSV 0.5 ML IM: CPT | Performed by: NURSE PRACTITIONER

## 2024-02-05 PROCEDURE — 99395 PREV VISIT EST AGE 18-39: CPT | Performed by: NURSE PRACTITIONER

## 2024-02-05 NOTE — PROGRESS NOTES
"  [facility]  Amando Smith, HARRISON      Nestor Jerome / 38 y.o. / male  2024                        VITALS     Visit Vitals  /82   Pulse 66   Temp 98 °F (36.7 °C) (Temporal)   Ht 175.3 cm (69\")   Wt 102 kg (224 lb 9.6 oz)   SpO2 99%   BMI 33.17 kg/m²       BP Readings from Last 3 Encounters:   24 128/82   23 131/85   23 132/82     Wt Readings from Last 3 Encounters:   24 102 kg (224 lb 9.6 oz)   23 98.9 kg (218 lb)   23 100 kg (221 lb 6.4 oz)      Body mass index is 33.17 kg/m².    MEDICATIONS     Current Outpatient Medications   Medication Sig Dispense Refill    lisinopril (PRINIVIL,ZESTRIL) 20 MG tablet Take 1 tablet by mouth Daily. 90 tablet 3    COVID-19 mRNA Vac-Ingrid,Pfizer, 30 MCG/0.3ML suspension Inject 0.3 mL into the appropriate muscle as directed by prescriber 1 (One) Time for 1 dose. 0.3 mL 0     No current facility-administered medications for this visit.       _____________________________________________________________________________________    CHIEF COMPLAINT     Annual Exam and Hypertension      HISTORY OF PRESENT ILLNESS      Nestor presents for annual health maintenance visit.    Last health maintenance visit: approximately 1 year ago  General health: good  Lifestyle:  Attempting to lose weight?: Yes   Diet: eats moderately healthy  Exercise: exercises 3-5 days weekly  Tobacco: Never used   Alcohol: occasional/infrequent  Work: Full-time  Reproductive health:  Sexually active?: Yes   Concern for STD?: No   Sexual problems?: No problems   Sees Urologist?: No   Depression Screenin/5/2024     1:27 PM   PHQ-2/PHQ-9 Depression Screening   Little Interest or Pleasure in Doing Things 0-->not at all   Feeling Down, Depressed or Hopeless 0-->not at all   PHQ-9: Brief Depression Severity Measure Score 0         PHQ-2: 0 (Not depressed)     PHQ-9: 0 (Negative screening for depression)    Patient Care Team:  Amando Smith APRN as PCP - " General (Internal Medicine)  ______________________________________________________________________    ALLERGIES  No Known Allergies     PFSH:     The following portions of the patient's history were reviewed and updated as appropriate: Allergies / Current Medications / Past Medical History / Surgical History / Social History / Family History    PROBLEM LIST   Patient Active Problem List   Diagnosis    Syncope       PAST MEDICAL HISTORY  Past Medical History:   Diagnosis Date    Depression 2001    After death of father.    Hypertension        SURGICAL HISTORY  Past Surgical History:   Procedure Laterality Date    KNEE ARTHROSCOPY W/ ACL RECONSTRUCTION Left 2001    KNEE ARTHROSCOPY W/ DEBRIDEMENT Right        SOCIAL HISTORY  Social History     Socioeconomic History    Marital status:    Tobacco Use    Smoking status: Never    Smokeless tobacco: Never   Vaping Use    Vaping Use: Never used   Substance and Sexual Activity    Alcohol use: Yes     Alcohol/week: 5.0 standard drinks of alcohol     Types: 5 Cans of beer per week    Drug use: Never    Sexual activity: Yes     Partners: Female     Birth control/protection: None       FAMILY HISTORY  Family History   Problem Relation Age of Onset    Diabetes type II Mother     Obesity Mother     Depression Mother     Diabetes Mother     Stroke Father     Arthritis Maternal Grandmother     Breast cancer Maternal Grandmother     Cancer Maternal Grandmother     Arthritis Paternal Grandmother     Hearing loss Paternal Grandmother     Other Paternal Grandmother         Epilepsy    Heart disease Maternal Grandfather        IMMUNIZATION HISTORY  Immunization History   Administered Date(s) Administered    COVID-19 (MODERNA) 1st,2nd,3rd Dose Monovalent 03/23/2021, 04/20/2021, 11/22/2021    COVID-19 (PFIZER) BIVALENT 12+YRS 02/27/2023    COVID-19 F23 (PFIZER) 12YRS+ (COMIRNATY) 02/05/2024    Fluzone (or Fluarix & Flulaval for VFC) >6mos 12/07/2021, 02/05/2024    Hepatitis A  01/24/2023, 07/24/2023    TD Preservative Free (Tenivac) 01/21/2022       ______________________________________________________________________    REVIEW OF SYSTEMS     Review of Systems  Constitutional: Negative.    HENT: Negative.    Eyes: Negative.    Respiratory: Negative.    Cardiovascular: Negative.    Gastrointestinal: Negative.    Endocrine: Negative.    Genitourinary: Negative.    Musculoskeletal: Negative.    Skin: Negative.    Allergic/Immunologic: Negative.    Neurological: Negative.    Hematological: Negative.    Psychiatric/Behavioral: Negative.      PHYSICAL EXAMINATION     Physical Exam  Constitutional:       Appearance: Normal appearance.   HENT:      Head: Normocephalic and atraumatic.      Right Ear: Tympanic membrane normal.      Left Ear: Tympanic membrane normal.      Nose: Nose normal. No congestion.      Mouth/Throat:      Mouth: Mucous membranes are moist.      Pharynx: Oropharynx is clear.   Eyes:      Conjunctiva/sclera: Conjunctivae normal.      Pupils: Pupils are equal, round, and reactive to light.   Neck:      Vascular: No carotid bruit.   Cardiovascular:      Rate and Rhythm: Normal rate and regular rhythm.      Pulses: Normal pulses.      Heart sounds: Normal heart sounds.   Pulmonary:      Effort: Pulmonary effort is normal.      Breath sounds: Normal breath sounds.   Abdominal:      General: There is no distension.      Palpations: Abdomen is soft.      Tenderness: There is no abdominal tenderness. There is no right CVA tenderness, left CVA tenderness or guarding.   Genitourinary:     Comments: Defer BRIANNA until 49, neg family history of prostate cancer, no  symptoms   Musculoskeletal:         General: Normal range of motion.      Cervical back: Normal range of motion.      Right lower leg: No edema.      Left lower leg: No edema.   Lymphadenopathy:      Cervical: No cervical adenopathy.   Skin:     General: Skin is warm and dry.   Neurological:      Mental Status: He is alert and  "oriented to person, place, and time.      Cranial Nerves: No cranial nerve deficit.      Motor: No weakness.      Gait: Gait normal.   Psychiatric:         Mood and Affect: Mood normal.         Behavior: Behavior normal.         Thought Content: Thought content normal.         Judgment: Judgment normal.     REVIEWED DATA      Labs:    Lab Results   Component Value Date     02/02/2024    K 4.5 02/02/2024    CALCIUM 9.5 02/02/2024    AST 20 02/02/2024    ALT 34 02/02/2024    BUN 12 02/02/2024    CREATININE 1.02 02/02/2024    CREATININE 0.93 11/16/2023    CREATININE 0.94 07/24/2023    EGFRIFNONA 97 01/21/2022    EGFRIFAFRI 112 01/21/2022       Lab Results   Component Value Date    HGBA1C 5.10 02/02/2024    HGBA1C 5.20 01/17/2023    HGBA1C 5.3 01/21/2022    TSH 1.870 02/02/2024    FREET4 1.31 02/02/2024       No results found for: \"PSA\", \"TESTOSTEROTT\", \"TESTFRE\"    Lab Results   Component Value Date     (H) 02/02/2024    HDL 43 02/02/2024    TRIG 191 (H) 02/02/2024    CHOLHDLRATIO 5.09 02/02/2024       No components found for: \"YNBS498P\"    Lab Results   Component Value Date    WBC 5.95 02/02/2024    HGB 15.6 02/02/2024    MCV 91.6 02/02/2024     02/02/2024       Lab Results   Component Value Date    PROTEIN Trace (A) 02/02/2024    GLUCOSEU Negative 02/02/2024    BLOODU Negative 02/02/2024    NITRITEU Negative 02/02/2024    LEUKOCYTESUR Negative 02/02/2024          Lab Results   Component Value Date    HEPCVIRUSABY <0.1 01/21/2022       Imaging:           Medical Tests:       ______________________________________________________________________    ASSESSMENT & PLAN     ANNUAL WELLNESS EXAM / PHYSICAL     Other medical problems addressed today:    Summary/Discussion:     BMI is >= 30 and <35. (Class 1 Obesity). The following options were offered after discussion;: exercise counseling/recommendations   Blood pressure well-controlled with lisinopril  Flu vaccine administered today, COVID-19 at " pharmacy    Next Appointment with me: Visit date not found    Return in about 1 year (around 2/5/2025) for Annual physical.      HEALTHCARE MAINTENANCE ISSUES       Cancer Screening:  Colon: Initial/Next screening at age: 44  Repeat colon cancer screening: N/A at this time  Prostate: Start screening at 44 then annually  Testicular: Recommended monthly self exam  Skin: Monthly self skin examination, annual exam by health professional  Lung: Does not meet criteria for lung cancer screening.   Other:    Screening Labs & Tests:  Lab results reviewed & discussed with with patient or orders placed today.  EKG:  CV Screening: Lipid panel  DEXA (75+ or risk factors):   HEP C (If born 4030-4919 or risk factors): Negative screen  Other:     Immunization/Vaccinations (to be given today unless deferred by patient)  Influenza: Give flu shot today  Hepatitis A: Up to date  Tetanus/Pertussis: Up to date  Pneumovax: Not needed at this time  Shingles: Not needed at this time  Other: COVID-19 booster at pharmacy today     Lifestyle Counseling:  Lifestyle Modifications: Improve dietary compliance and Increase intensity/regularity of aerobic exercise  Safety Issues: Always wear seatbelt, Avoid texting while driving   Use sunscreen, regular skin examination  Recommended annual dental/vision examination.  Emotional/Stress/Sleep: Reviewed and  given when appropriate      Health Maintenance   Topic Date Due    BMI FOLLOWUP  01/24/2024    LIPID PANEL  02/02/2025    ANNUAL PHYSICAL  02/05/2025    TDAP/TD VACCINES (2 - Tdap) 01/21/2032    HEPATITIS C SCREENING  Completed    COVID-19 Vaccine  Completed    INFLUENZA VACCINE  Completed    Pneumococcal Vaccine 0-64  Aged Out       *dragon dictation used for documentation.

## 2024-02-05 NOTE — LETTER
[facility]  Vaccine Consent Form    Patient Name:  Nestor Jerome  Patient :  1985     Vaccine(s) Ordered    Fluzone (or Fluarix & Flulaval for VFC) >6 Mos ([id number])        Screening Checklist  The following questions should be completed prior to vaccination. If you answer “yes” to any question, it does not necessarily mean you should not be vaccinated. It just means we may need to clarify or ask more questions. If a question is unclear, please ask your healthcare provider to explain it.    Yes No   Any fever or moderate to severe illness today (mild illness and/or antibiotic treatment are not contraindications)?     Do you have a history of a serious reaction to any previous vaccinations, such as anaphylaxis, encephalopathy within 7 days, Guillain-Waterloo syndrome within 6 weeks, seizure?     Have you received any live vaccine(s) (e.g MMR, KEVIN) or any other vaccines in the last month (to ensure duplicate doses aren't given)?     Do you have an anaphylactic allergy to latex (DTaP, DTaP-IPV, Hep A, Hep B, MenB, RV, Td, Tdap), baker’s yeast (Hep B, HPV), polysorbates (RSV, nirsevimab, PCV 20, Rotavirrus, Tdap, Shingrix), or gelatin (KEVIN, MMR)?     Do you have an anaphylactic allergy to neomycin (Rabies, KEVIN, MMR, IPV, Hep A), polymyxin B (IPV), or streptomycin (IPV)?      Any cancer, leukemia, AIDS, or other immune system disorder? (KEVIN, MMR, RV)     Do you have a parent, brother, or sister with an immune system problem (if immune competence of vaccine recipient clinically verified, can proceed)? (MMR, KEVIN)     Any recent steroid treatments for >2 weeks, chemotherapy, or radiation treatment? (KEVIN, MMR)     Have you received antibody-containing blood transfusions or IVIG in the past 11 months (recommended interval is dependent on product)? (MMR, KEVIN)     Have you taken antiviral drugs (acyclovir, famciclovir, valacyclovir for KEVIN) in the last 24 or 48 hours, respectively?      Are you pregnant or planning  "to become pregnant within 1 month? (KEVIN, MMR, HPV, IPV, MenB, Abrexvy; For Hep B- refer to Engerix-B; For RSV - Abrysvo is indicated for 32-36 weeks of pregnancy from September to January)     For infants, have you ever been told your child has had intussusception or a medical emergency involving obstruction of the intestine (Rotavirus)? If not for an infant, can skip this question.         *Ordering Physicians/APC should be consulted if \"yes\" is checked by the patient or guardian above.  I have received, read, and understand the Vaccine Information Statement (VIS) for each vaccine ordered.  I have considered my or my child's health status as well as the health status of my close contacts.  I have taken the opportunity to discuss my vaccine questions with my or my child's health care provider.   I have requested that the ordered vaccine(s) be given to me or my child.  I understand the benefits and risks of the vaccines.  I understand that I should remain in the clinic for 15 minutes after receiving the vaccine(s).  _________________________________________________________  Signature of Patient or Parent/Legal Guardian ____________________  Date     "

## 2024-04-23 DIAGNOSIS — I10 PRIMARY HYPERTENSION: ICD-10-CM

## 2024-04-23 RX ORDER — LISINOPRIL 20 MG/1
20 TABLET ORAL DAILY
Qty: 90 TABLET | Refills: 1 | Status: SHIPPED | OUTPATIENT
Start: 2024-04-23

## 2024-11-01 DIAGNOSIS — I10 PRIMARY HYPERTENSION: ICD-10-CM

## 2024-11-01 RX ORDER — LISINOPRIL 20 MG/1
20 TABLET ORAL DAILY
Qty: 90 TABLET | Refills: 1 | Status: SHIPPED | OUTPATIENT
Start: 2024-11-01

## 2025-01-28 DIAGNOSIS — Z00.00 ENCOUNTER FOR ANNUAL PHYSICAL EXAM: ICD-10-CM

## 2025-01-29 LAB
ALBUMIN SERPL-MCNC: 4.5 G/DL (ref 3.5–5.2)
ALBUMIN/GLOB SERPL: 1.8 G/DL
ALP SERPL-CCNC: 69 U/L (ref 39–117)
ALT SERPL-CCNC: 29 U/L (ref 1–41)
APPEARANCE UR: CLEAR
AST SERPL-CCNC: 18 U/L (ref 1–40)
BASOPHILS # BLD AUTO: 0.04 10*3/MM3 (ref 0–0.2)
BASOPHILS NFR BLD AUTO: 0.7 % (ref 0–1.5)
BILIRUB SERPL-MCNC: 0.9 MG/DL (ref 0–1.2)
BILIRUB UR QL STRIP: NEGATIVE
BUN SERPL-MCNC: 14 MG/DL (ref 6–20)
BUN/CREAT SERPL: 16.7 (ref 7–25)
CALCIUM SERPL-MCNC: 9.6 MG/DL (ref 8.6–10.5)
CHLORIDE SERPL-SCNC: 101 MMOL/L (ref 98–107)
CHOLEST SERPL-MCNC: 233 MG/DL (ref 0–200)
CHOLEST/HDLC SERPL: 5.83 {RATIO}
CO2 SERPL-SCNC: 27.9 MMOL/L (ref 22–29)
COLOR UR: YELLOW
CREAT SERPL-MCNC: 0.84 MG/DL (ref 0.76–1.27)
EGFRCR SERPLBLD CKD-EPI 2021: 113.1 ML/MIN/1.73
EOSINOPHIL # BLD AUTO: 0.31 10*3/MM3 (ref 0–0.4)
EOSINOPHIL NFR BLD AUTO: 5.1 % (ref 0.3–6.2)
ERYTHROCYTE [DISTWIDTH] IN BLOOD BY AUTOMATED COUNT: 13.4 % (ref 12.3–15.4)
GLOBULIN SER CALC-MCNC: 2.5 GM/DL
GLUCOSE SERPL-MCNC: 86 MG/DL (ref 65–99)
GLUCOSE UR QL STRIP: NEGATIVE
HBA1C MFR BLD: 5.3 % (ref 4.8–5.6)
HCT VFR BLD AUTO: 46.8 % (ref 37.5–51)
HDLC SERPL-MCNC: 40 MG/DL (ref 40–60)
HGB BLD-MCNC: 16 G/DL (ref 13–17.7)
HGB UR QL STRIP: NEGATIVE
IMM GRANULOCYTES # BLD AUTO: 0.02 10*3/MM3 (ref 0–0.05)
IMM GRANULOCYTES NFR BLD AUTO: 0.3 % (ref 0–0.5)
KETONES UR QL STRIP: NEGATIVE
LDLC SERPL CALC-MCNC: 146 MG/DL (ref 0–100)
LEUKOCYTE ESTERASE UR QL STRIP: NEGATIVE
LYMPHOCYTES # BLD AUTO: 1.33 10*3/MM3 (ref 0.7–3.1)
LYMPHOCYTES NFR BLD AUTO: 21.8 % (ref 19.6–45.3)
MCH RBC QN AUTO: 31.7 PG (ref 26.6–33)
MCHC RBC AUTO-ENTMCNC: 34.2 G/DL (ref 31.5–35.7)
MCV RBC AUTO: 92.7 FL (ref 79–97)
MONOCYTES # BLD AUTO: 0.5 10*3/MM3 (ref 0.1–0.9)
MONOCYTES NFR BLD AUTO: 8.2 % (ref 5–12)
NEUTROPHILS # BLD AUTO: 3.89 10*3/MM3 (ref 1.7–7)
NEUTROPHILS NFR BLD AUTO: 63.9 % (ref 42.7–76)
NITRITE UR QL STRIP: NEGATIVE
NRBC BLD AUTO-RTO: 0 /100 WBC (ref 0–0.2)
PH UR STRIP: 6 [PH] (ref 5–8)
PLATELET # BLD AUTO: 281 10*3/MM3 (ref 140–450)
POTASSIUM SERPL-SCNC: 4.4 MMOL/L (ref 3.5–5.2)
PROT SERPL-MCNC: 7 G/DL (ref 6–8.5)
PROT UR QL STRIP: NEGATIVE
RBC # BLD AUTO: 5.05 10*6/MM3 (ref 4.14–5.8)
SODIUM SERPL-SCNC: 139 MMOL/L (ref 136–145)
SP GR UR STRIP: 1.02 (ref 1–1.03)
T4 FREE SERPL-MCNC: 1.2 NG/DL (ref 0.92–1.68)
TRIGL SERPL-MCNC: 259 MG/DL (ref 0–150)
TSH SERPL DL<=0.005 MIU/L-ACNC: 2.38 UIU/ML (ref 0.27–4.2)
UROBILINOGEN UR STRIP-MCNC: NORMAL MG/DL
VLDLC SERPL CALC-MCNC: 47 MG/DL (ref 5–40)
WBC # BLD AUTO: 6.09 10*3/MM3 (ref 3.4–10.8)

## 2025-01-30 DIAGNOSIS — I10 PRIMARY HYPERTENSION: ICD-10-CM

## 2025-01-30 RX ORDER — LISINOPRIL 20 MG/1
20 TABLET ORAL DAILY
Qty: 90 TABLET | Refills: 1 | Status: SHIPPED | OUTPATIENT
Start: 2025-01-30

## 2025-02-06 ENCOUNTER — OFFICE VISIT (OUTPATIENT)
Dept: INTERNAL MEDICINE | Age: 40
End: 2025-02-06
Payer: COMMERCIAL

## 2025-02-06 VITALS
DIASTOLIC BLOOD PRESSURE: 88 MMHG | OXYGEN SATURATION: 99 % | TEMPERATURE: 97.3 F | SYSTOLIC BLOOD PRESSURE: 136 MMHG | WEIGHT: 224.2 LBS | HEART RATE: 76 BPM | BODY MASS INDEX: 33.21 KG/M2 | HEIGHT: 69 IN

## 2025-02-06 DIAGNOSIS — M79.671 BILATERAL FOOT PAIN: ICD-10-CM

## 2025-02-06 DIAGNOSIS — Z00.00 ENCOUNTER FOR ANNUAL PHYSICAL EXAM: Primary | ICD-10-CM

## 2025-02-06 DIAGNOSIS — I10 PRIMARY HYPERTENSION: ICD-10-CM

## 2025-02-06 DIAGNOSIS — M79.672 BILATERAL FOOT PAIN: ICD-10-CM

## 2025-02-06 PROCEDURE — 99214 OFFICE O/P EST MOD 30 MIN: CPT | Performed by: NURSE PRACTITIONER

## 2025-02-06 PROCEDURE — 99396 PREV VISIT EST AGE 40-64: CPT | Performed by: NURSE PRACTITIONER

## 2025-02-06 RX ORDER — LISINOPRIL 40 MG/1
40 TABLET ORAL DAILY
Qty: 90 TABLET | Refills: 1 | Status: SHIPPED | OUTPATIENT
Start: 2025-02-06

## 2025-02-06 NOTE — PROGRESS NOTES
"                     S K Y L E R    F R Y E ,   D N P ,  A P R N                  I  N  T  E  R  N  A  L    M  E  D  I  C  I  N  E         ENCOUNTER DATE:  02/06/2025    Nestor Jerome / 39 y.o. / male    ANNUAL PREVENTATIVE / PHYSICAL ENCOUNTER       CHIEF COMPLAINT     Hypertension, Annual Exam, and Foot Pain      VITALS     Vitals:    02/06/25 1459   BP: 136/88   Pulse: 76   Temp: 97.3 °F (36.3 °C)   SpO2: 99%   Weight: 102 kg (224 lb 3.2 oz)   Height: 175.3 cm (69\")       BP Readings from Last 3 Encounters:   02/06/25 136/88   03/18/24 136/87   02/05/24 128/82     Wt Readings from Last 3 Encounters:   02/06/25 102 kg (224 lb 3.2 oz)   02/05/24 102 kg (224 lb 9.6 oz)   11/16/23 98.9 kg (218 lb)      Body mass index is 33.11 kg/m².    MEDICATIONS     Current Outpatient Medications on File Prior to Visit   Medication Sig Dispense Refill    [DISCONTINUED] lisinopril (PRINIVIL,ZESTRIL) 20 MG tablet TAKE 1 TABLET BY MOUTH DAILY 90 tablet 1     No current facility-administered medications on file prior to visit.         HISTORY OF PRESENT ILLNESS      Nestor presents for annual health maintenance visit.    Complaint of bilateral foot pain sole of foot, near the ball of the foot.  No erythema tingling numbness or swelling.      Blood pressure uncontrolled with lisinopril 20 mg daily but no chest pain, shortness of breath palpitations headache.    Patient Care Team:  Amando Smith, ALFONSO, APRN as PCP - General (Internal Medicine)    General health: good  Lifestyle:  Attempting to lose weight?: Yes   Diet: eats moderately healthy  Exercise: has not been as active recently  Tobacco: Never used   Alcohol: occasional/infrequent  Work: Full-time  Reproductive health:  Sexually active?: Yes   Concern for STD?: No   Sexual problems?: No problems   Sees Urologist?: No   Depression Screening:      PHQ-2 Depression Screening  Little interest or pleasure in doing things? Not at all   Feeling down, depressed, or hopeless? Not at all "   PHQ-2 Total Score 0      PHQ-2: 0 (Not depressed)     PHQ-9: 0 (Negative screening for depression)    ______________________________________________________________________    ALLERGIES  No Known Allergies     PFSH:     The following portions of the patient's history were reviewed and updated as appropriate: Allergies / Current Medications / Past Medical History / Surgical History / Social History / Family History    PROBLEM LIST   Patient Active Problem List   Diagnosis    Syncope       PAST MEDICAL HISTORY  Past Medical History:   Diagnosis Date    Depression 2001    After death of father.    Hypertension        SURGICAL HISTORY  Past Surgical History:   Procedure Laterality Date    KNEE ARTHROSCOPY W/ ACL RECONSTRUCTION Left 2001    KNEE ARTHROSCOPY W/ DEBRIDEMENT Right        SOCIAL HISTORY  Social History     Socioeconomic History    Marital status:    Tobacco Use    Smoking status: Never    Smokeless tobacco: Never   Vaping Use    Vaping status: Never Used   Substance and Sexual Activity    Alcohol use: Yes     Alcohol/week: 5.0 standard drinks of alcohol     Types: 5 Cans of beer per week    Drug use: Never    Sexual activity: Yes     Partners: Female     Birth control/protection: None       FAMILY HISTORY  Family History   Problem Relation Age of Onset    Diabetes type II Mother     Obesity Mother     Depression Mother     Diabetes Mother     Stroke Father     Arthritis Maternal Grandmother     Breast cancer Maternal Grandmother     Cancer Maternal Grandmother     Arthritis Paternal Grandmother     Hearing loss Paternal Grandmother     Heart disease Maternal Grandfather        IMMUNIZATION HISTORY  Immunization History   Administered Date(s) Administered    COVID-19 (MODERNA) 12YRS+ (SPIKEVAX) 11/09/2024    COVID-19 (MODERNA) 1st,2nd,3rd Dose Monovalent 03/23/2021, 04/20/2021, 11/22/2021    COVID-19 (PFIZER) 12YRS+ (COMIRNATY) 02/05/2024    COVID-19 (PFIZER) BIVALENT 12+YRS 02/27/2023    Fluzone   >6mos 11/09/2024    Fluzone (or Fluarix & Flulaval for VFC) >6mos 12/07/2021, 02/05/2024    Hepatitis A 01/24/2023, 07/24/2023    TD Preservative Free (Tenivac) 01/21/2022         REVIEW OF SYSTEMS     Review of Systems  Constitutional: Negative.    HENT: Negative.    Eyes: Negative.    Respiratory: Negative.    Cardiovascular: Negative.    Gastrointestinal: Negative.    Endocrine: Negative.    Genitourinary: Negative.    Musculoskeletal: Bilateral ball of foot pain  Skin: Negative.    Allergic/Immunologic: Negative.    Neurological: Negative.    Hematological: Negative.    Psychiatric/Behavioral: Negative.      PHYSICAL EXAMINATION     Physical Exam  Constitutional:       Appearance: Normal appearance.   HENT:      Head: Normocephalic and atraumatic.      Right Ear: Tympanic membrane normal.      Left Ear: Tympanic membrane normal.      Nose: Nose normal. No congestion.      Mouth/Throat:      Mouth: Mucous membranes are moist.      Pharynx: Oropharynx is clear.   Eyes:      Conjunctiva/sclera: Conjunctivae normal.      Pupils: Pupils are equal, round, and reactive to light.   Neck:      Vascular: No carotid bruit.   Cardiovascular:      Rate and Rhythm: Normal rate and regular rhythm.      Pulses: Normal pulses.      Heart sounds: Normal heart sounds.   Pulmonary:      Effort: Pulmonary effort is normal.      Breath sounds: Normal breath sounds.   Abdominal:      General: There is no distension.      Palpations: Abdomen is soft.      Tenderness: There is no abdominal tenderness. There is no right CVA tenderness, left CVA tenderness or guarding.   Genitourinary:     Comments: Defer BRIANNA until 49, neg family history of prostate cancer, no  symptoms   Musculoskeletal:         General: Normal range of motion.      Cervical back: Normal range of motion.      Right lower leg: No edema.      Left lower leg: No edema.   Negative swelling of the bilateral foot, no erythema  Lymphadenopathy:      Cervical: No cervical  "adenopathy.   Skin:     General: Skin is warm and dry.   Neurological:      Mental Status: He is alert and oriented to person, place, and time.      Cranial Nerves: No cranial nerve deficit.      Motor: No weakness.      Gait: Gait normal.   Psychiatric:         Mood and Affect: Mood normal.         Behavior: Behavior normal.         Thought Content: Thought content normal.         Judgment: Judgment normal.     REVIEWED DATA      Labs:    Lab Results   Component Value Date     01/29/2025    K 4.4 01/29/2025    CALCIUM 9.6 01/29/2025    AST 18 01/29/2025    ALT 29 01/29/2025    BUN 14 01/29/2025    CREATININE 0.84 01/29/2025    CREATININE 1.02 02/02/2024    CREATININE 0.93 11/16/2023    EGFRIFNONA 97 01/21/2022    EGFRIFAFRI 112 01/21/2022     Lab Results   Component Value Date    GLUCOSE 86 01/29/2025    HGBA1C 5.30 01/29/2025    HGBA1C 5.10 02/02/2024    HGBA1C 5.20 01/17/2023    TSH 2.380 01/29/2025    FREET4 1.20 01/29/2025     No results found for: \"PSA\"    Lab Results   Component Value Date     (H) 01/29/2025    HDL 40 01/29/2025    TRIG 259 (H) 01/29/2025    CHOLHDLRATIO 5.83 01/29/2025   No components found for: \"LXLG308A\"  Lab Results   Component Value Date    WBC 6.09 01/29/2025    HGB 16.0 01/29/2025    MCV 92.7 01/29/2025     01/29/2025     Lab Results   Component Value Date    PROTEIN Negative 01/29/2025    GLUCOSEU Negative 01/29/2025    BLOODU Negative 01/29/2025    NITRITEU Negative 01/29/2025    LEUKOCYTESUR Negative 01/29/2025     Lab Results   Component Value Date    HEPCVIRUSABY <0.1 01/21/2022       Imaging:                   Medical Tests:                 Summary of old records / correspondence / consultant report:               Request outside records:         ASSESSMENT & PLAN     ANNUAL WELLNESS EXAM / PHYSICAL     Other medical problems addressed today:  Problem List Items Addressed This Visit    None  Visit Diagnoses       Encounter for annual physical exam    -  " Primary    Primary hypertension        Relevant Medications    lisinopril (PRINIVIL,ZESTRIL) 40 MG tablet    Bilateral foot pain        Relevant Orders    Ambulatory Referral to Podiatry (Completed)             Summary/Discussion:     Blood pressure uncontrolled will increase lisinopril from 20 to 40 mg daily.  Monitor blood pressure at home and send a Primary Data message within 1 month.  Referral to podiatry for foot pain.    Next Appointment with me: Visit date not found    Return in about 1 year (around 2/6/2026) for Annual physical with labs one week prior .      HEALTHCARE MAINTENANCE ISSUES     Cancer Screening:  Colon: Initial/Next screening at age: 44  Repeat colon cancer screening: N/A at this time  Prostate: Start screening at 45 then annually  Testicular: Recommended monthly self exam  Skin: Monthly self skin examination, annual exam by health professional  Lung: Does not meet criteria for lung cancer screening.   Other:    Screening Labs & Tests:  Lab results reviewed & discussed with with patient or orders placed today.  EKG:  CV Screening: Lipid panel  DEXA (75+ or risk factors):   HEP C (If born 9575-6[birth year] or risk factors): Negative screen  Other:     Immunization/Vaccinations (to be given today unless deferred by patient)  Influenza: Patient had the flu shot this season  Hepatitis A: Up to date  Hepatitis B: Verify immunization records  Tetanus/Pertussis: Up to date  Pneumococcal: Not needed at this time  Shingles: Not needed at this time  COVID: Plans to take the latest booster  RSV: Not indicated    Lifestyle Counseling:  Lifestyle Modifications: Improve dietary compliance and Increase intensity/regularity of aerobic exercise  Safety Issues: Always wear seatbelt, Avoid texting while driving   Use sunscreen, regular skin examination  Recommended annual dental/vision examination.  Emotional/Stress/Sleep: Reviewed and  given when appropriate      Health Maintenance   Topic Date Due    ANNUAL  PHYSICAL  02/05/2025    BMI FOLLOWUP  02/05/2025    LIPID PANEL  01/29/2026    TDAP/TD VACCINES (2 - Tdap) 01/21/2032    HEPATITIS C SCREENING  Completed    COVID-19 Vaccine  Completed    INFLUENZA VACCINE  Completed    Pneumococcal Vaccine 0-64  Aged Out

## 2025-05-16 DIAGNOSIS — I10 PRIMARY HYPERTENSION: ICD-10-CM

## 2025-05-16 RX ORDER — LISINOPRIL 40 MG/1
40 TABLET ORAL DAILY
Qty: 90 TABLET | Refills: 1 | Status: SHIPPED | OUTPATIENT
Start: 2025-05-16